# Patient Record
Sex: MALE | Race: WHITE | HISPANIC OR LATINO | Employment: OTHER | URBAN - METROPOLITAN AREA
[De-identification: names, ages, dates, MRNs, and addresses within clinical notes are randomized per-mention and may not be internally consistent; named-entity substitution may affect disease eponyms.]

---

## 2017-01-26 ENCOUNTER — ALLSCRIPTS OFFICE VISIT (OUTPATIENT)
Dept: OTHER | Facility: OTHER | Age: 62
End: 2017-01-26

## 2017-01-26 DIAGNOSIS — E78.5 HYPERLIPIDEMIA: ICD-10-CM

## 2017-01-26 DIAGNOSIS — R07.1 CHEST PAIN ON BREATHING: ICD-10-CM

## 2017-02-06 ENCOUNTER — GENERIC CONVERSION - ENCOUNTER (OUTPATIENT)
Dept: OTHER | Facility: OTHER | Age: 62
End: 2017-02-06

## 2017-02-27 ENCOUNTER — ALLSCRIPTS OFFICE VISIT (OUTPATIENT)
Dept: OTHER | Facility: OTHER | Age: 62
End: 2017-02-27

## 2017-03-27 ENCOUNTER — ALLSCRIPTS OFFICE VISIT (OUTPATIENT)
Dept: OTHER | Facility: OTHER | Age: 62
End: 2017-03-27

## 2017-04-17 LAB
BASOPHILS # BLD AUTO: 0 %
BASOPHILS # BLD AUTO: 0 X10E3/UL (ref 0–0.2)
DEPRECATED RDW RBC AUTO: 15 % (ref 12.3–15.4)
EOSINOPHIL # BLD AUTO: 0.2 X10E3/UL (ref 0–0.4)
EOSINOPHIL # BLD AUTO: 3 %
HCT VFR BLD AUTO: 39 % (ref 37.5–51)
HGB BLD-MCNC: 12.8 G/DL (ref 12.6–17.7)
IMM.GRANULOCYTES (CD4/8) (HISTORICAL): 0 %
IMM.GRANULOCYTES (CD4/8) (HISTORICAL): 0 X10E3/UL (ref 0–0.1)
LYMPHOCYTES # BLD AUTO: 2.6 X10E3/UL (ref 0.7–3.1)
LYMPHOCYTES # BLD AUTO: 43 %
MCH RBC QN AUTO: 27.5 PG (ref 26.6–33)
MCHC RBC AUTO-ENTMCNC: 32.8 G/DL (ref 31.5–35.7)
MCV RBC AUTO: 84 FL (ref 79–97)
MONOCYTES # BLD AUTO: 0.4 X10E3/UL (ref 0.1–0.9)
MONOCYTES (HISTORICAL): 6 %
NEUTROPHILS # BLD AUTO: 2.7 X10E3/UL (ref 1.4–7)
NEUTROPHILS # BLD AUTO: 48 %
PLATELET # BLD AUTO: 192 X10E3/UL (ref 150–379)
PROSTATE SPECIFIC ANTIGEN (HISTORICAL): 2.7 NG/ML (ref 0–4)
RBC (HISTORICAL): 4.66 X10E6/UL (ref 4.14–5.8)
WBC # BLD AUTO: 5.9 X10E3/UL (ref 3.4–10.8)

## 2017-04-18 LAB
A/G RATIO (HISTORICAL): 1.5 (ref 1.2–2.2)
ALBUMIN SERPL BCP-MCNC: 4.2 G/DL (ref 3.6–4.8)
ALP SERPL-CCNC: 74 IU/L (ref 39–117)
ALT SERPL W P-5'-P-CCNC: 33 IU/L (ref 0–44)
AST SERPL W P-5'-P-CCNC: 23 IU/L (ref 0–40)
BILIRUB SERPL-MCNC: 0.8 MG/DL (ref 0–1.2)
BUN SERPL-MCNC: 14 MG/DL (ref 8–27)
BUN/CREA RATIO (HISTORICAL): 12 (ref 10–24)
CALCIUM SERPL-MCNC: 8.6 MG/DL (ref 8.6–10.2)
CHLORIDE SERPL-SCNC: 105 MMOL/L (ref 96–106)
CHOLEST SERPL-MCNC: 208 MG/DL (ref 100–199)
CO2 SERPL-SCNC: 23 MMOL/L (ref 18–29)
CREAT SERPL-MCNC: 1.15 MG/DL (ref 0.76–1.27)
EGFR AFRICAN AMERICAN (HISTORICAL): 79 ML/MIN/1.73
EGFR-AMERICAN CALC (HISTORICAL): 68 ML/MIN/1.73
GLUCOSE SERPL-MCNC: 105 MG/DL (ref 65–99)
HDLC SERPL-MCNC: 44 MG/DL
LDLC SERPL CALC-MCNC: 114 MG/DL (ref 0–99)
POTASSIUM SERPL-SCNC: 4.1 MMOL/L (ref 3.5–5.2)
SODIUM SERPL-SCNC: 144 MMOL/L (ref 134–144)
TOT. GLOBULIN, SERUM (HISTORICAL): 2.8 G/DL (ref 1.5–4.5)
TOTAL PROTEIN (HISTORICAL): 7 G/DL (ref 6–8.5)
TRIGL SERPL-MCNC: 249 MG/DL (ref 0–149)

## 2017-04-19 ENCOUNTER — GENERIC CONVERSION - ENCOUNTER (OUTPATIENT)
Dept: OTHER | Facility: OTHER | Age: 62
End: 2017-04-19

## 2017-05-19 ENCOUNTER — GENERIC CONVERSION - ENCOUNTER (OUTPATIENT)
Dept: OTHER | Facility: OTHER | Age: 62
End: 2017-05-19

## 2017-06-26 ENCOUNTER — ALLSCRIPTS OFFICE VISIT (OUTPATIENT)
Dept: OTHER | Facility: OTHER | Age: 62
End: 2017-06-26

## 2017-07-21 LAB
CHOLEST SERPL-MCNC: 185 MG/DL (ref 100–199)
HBA1C MFR BLD HPLC: 5.6 % (ref 4.8–5.6)
HDLC SERPL-MCNC: 45 MG/DL
LDLC SERPL CALC-MCNC: 92 MG/DL (ref 0–99)
TRIGL SERPL-MCNC: 241 MG/DL (ref 0–149)

## 2017-07-26 ENCOUNTER — GENERIC CONVERSION - ENCOUNTER (OUTPATIENT)
Dept: OTHER | Facility: OTHER | Age: 62
End: 2017-07-26

## 2017-08-29 DIAGNOSIS — H92.03 OTALGIA OF BOTH EARS: ICD-10-CM

## 2017-09-05 ENCOUNTER — GENERIC CONVERSION - ENCOUNTER (OUTPATIENT)
Dept: OTHER | Facility: OTHER | Age: 62
End: 2017-09-05

## 2017-09-05 ENCOUNTER — ALLSCRIPTS OFFICE VISIT (OUTPATIENT)
Dept: OTHER | Facility: OTHER | Age: 62
End: 2017-09-05

## 2017-09-25 ENCOUNTER — GENERIC CONVERSION - ENCOUNTER (OUTPATIENT)
Dept: OTHER | Facility: OTHER | Age: 62
End: 2017-09-25

## 2017-10-09 ENCOUNTER — GENERIC CONVERSION - ENCOUNTER (OUTPATIENT)
Dept: OTHER | Facility: OTHER | Age: 62
End: 2017-10-09

## 2017-11-07 ENCOUNTER — GENERIC CONVERSION - ENCOUNTER (OUTPATIENT)
Dept: OTHER | Facility: OTHER | Age: 62
End: 2017-11-07

## 2018-01-08 ENCOUNTER — GENERIC CONVERSION - ENCOUNTER (OUTPATIENT)
Dept: OTHER | Facility: OTHER | Age: 63
End: 2018-01-08

## 2018-01-11 NOTE — MISCELLANEOUS
Provider Comments  Provider Comments:   L/M TO R/S      Signatures   Electronically signed by : GIANFRANCO Lam ; Sep 26 2017  7:23AM EST                       (Author)

## 2018-01-11 NOTE — MISCELLANEOUS
Message  I called patient left message re labs all good trig somewhat elevated will f/u      Signatures   Electronically signed by : GIANFRANCO Cervantes ; Jul 26 2017 12:55PM EST                       (Author)

## 2018-01-12 NOTE — MISCELLANEOUS
Message   Recorded as Task   Date: 05/15/2017 03:56 PM, Created By: Ophelia Inman   Task Name: Medical Record Request   Assigned To:  Garfield Cannon   Regarding Patient: Nettie Curtis, Status: Active   Comment:    Ophelia Inman - 15 May 2017 3:56 PM     TASK CREATED  PT WOULD LIKE A CALL ABOUT WHAT TO DO WITH CHOLESTROL RESULT   [address]  [phone]   I have left several messages as to chol results and instructions      Signatures   Electronically signed by : GIANFRANCO Cervantes ; May 19 2017 11:30AM EST                       (Author)

## 2018-01-13 VITALS
DIASTOLIC BLOOD PRESSURE: 90 MMHG | HEIGHT: 69 IN | HEART RATE: 66 BPM | OXYGEN SATURATION: 97 % | WEIGHT: 255.38 LBS | BODY MASS INDEX: 37.83 KG/M2 | RESPIRATION RATE: 18 BRPM | SYSTOLIC BLOOD PRESSURE: 130 MMHG

## 2018-01-13 NOTE — MISCELLANEOUS
Message  I called patient left message re labs chol elev I asked him to call to verify if he is still taking atorvastatin        Signatures   Electronically signed by : GIANFRANCO Wang ; Apr 19 2017 10:53AM EST                       (Author)

## 2018-01-14 VITALS
OXYGEN SATURATION: 97 % | HEIGHT: 69 IN | SYSTOLIC BLOOD PRESSURE: 140 MMHG | DIASTOLIC BLOOD PRESSURE: 76 MMHG | HEART RATE: 82 BPM | RESPIRATION RATE: 18 BRPM | BODY MASS INDEX: 36.96 KG/M2 | WEIGHT: 249.56 LBS

## 2018-01-14 VITALS
TEMPERATURE: 97.7 F | HEART RATE: 91 BPM | SYSTOLIC BLOOD PRESSURE: 126 MMHG | OXYGEN SATURATION: 96 % | HEIGHT: 69 IN | WEIGHT: 253.5 LBS | BODY MASS INDEX: 37.55 KG/M2 | DIASTOLIC BLOOD PRESSURE: 74 MMHG | RESPIRATION RATE: 18 BRPM

## 2018-01-15 VITALS
DIASTOLIC BLOOD PRESSURE: 80 MMHG | BODY MASS INDEX: 37.63 KG/M2 | WEIGHT: 254.06 LBS | SYSTOLIC BLOOD PRESSURE: 128 MMHG | HEART RATE: 77 BPM | HEIGHT: 69 IN | OXYGEN SATURATION: 95 % | RESPIRATION RATE: 18 BRPM | TEMPERATURE: 96.5 F

## 2018-01-16 NOTE — PROGRESS NOTES
Assessment    1  Encounter for preventive health examination (V70 0) (Z00 00)   2  H/O partial nephrectomy (V15 29) (Z90 5)    Plan   Health Maintenance    · (1) CBC/PLT/DIFF; Status:Active; Requested WCT:[id number];    · (1) COMPREHENSIVE METABOLIC PANEL; Status:Active; Requested RVR:[id number];    · (1) LIPID PANEL, FASTING; Status:Active; Requested for:25Jan2016;    · (1) PSA (SCREEN) (Dx V76 44 Screen for Prostate Cancer); Status:Active; Requested  EHY:[id number];     Urology Referral Other Physician Referral  Consult  Status: Hold For - Scheduling  Requested for: 08HII4027  Ordered; For: H/O partial nephrectomy;  Ordered By: Colletta Harpin  Performed:   Due: 12NGV1757     Discussion/Summary  Discussion Summary:   #HCM  -cbc,bmp,fasting lipid, PSA ordered  Chief Complaint  Chief Complaint Free Text Note Form: PT is here for 6 month FU for previous Dr Davonte Paulino  PT states every 6 month he needs blood work drawn for cholesterol and PSA  PT c/o RT side pain from previous surgery five years ago when he had 25% of his kidney removed  History of Present Illness  HPI: new patient for establishment of care  he takes lipitor 20mg daily, baby aspirin daily,   today, comlains of rear right back pain  he also doesn't know what respiratory problem he has, but was told he needed ventolin  uses it 4-5x a month  never hospitalized for difficulty breathing  he thinks it was copd  removed part of right kidney, 25%, 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  He would like a referral to see a urologist that he can follow up with now  He is employed,he works in [employer] maintenance  last colonoscopy done 3-4 years ago  pmh:high cholesterol  for more than 20 years  taking lipitor since then  Review of Systems  Complete-Male:   Constitutional: no fever and not feeling poorly  Eyes: no eyesight problems  ENT: no earache  Cardiovascular: no chest pain  Respiratory: no cough  Gastrointestinal: no abdominal pain and no nausea  Genitourinary: no dysuria, no urinary hesitancy, no incontinence and no nocturia  Musculoskeletal: no limb pain  Neurological: no numbness and no dizziness  Family History    1  No pertinent family history    2  No pertinent family history    Social History    · Non-smoker (V49 89) (Z78 9)    Current Meds   1  Lipitor 20 MG Oral Tablet; Therapy: ([place] [facility]) to Recorded   2  Ventolin AERS; Therapy: (Recorded:25Jan2016) to Recorded    Allergies    1  No Known Drug Allergies    Vitals  Vital Signs [Data Includes: Current Encounter]    Recorded: 64JGG5454 04:02PM   Temperature 97 F   Heart Rate 83   Respiration 18   Systolic 145   Diastolic 70   Height 5 ft 8 5 in   Weight 215 lb 4 oz   BMI Calculated 32 25   BSA Calculated 2 12   O2 Saturation 97     Physical Exam    Constitutional   General appearance: No acute distress, well appearing and well nourished  Ears, Nose, Mouth, and Throat   Otoscopic examination: Tympanic membrance translucent with normal light reflex  Canals patent without erythema  Oropharynx: Normal with no erythema, edema, exudate or lesions  Pulmonary   Auscultation of lungs: Clear to auscultation, equal breath sounds bilaterally, no wheezes, no rales, no rhonci  Cardiovascular   Auscultation of heart: Normal rate and rhythm, normal S1 and S2, without murmurs  Abdomen umbilical hernia 2cm diameter,reducible, non-tender,non-erythematous  Musculoskeletal   Gait and station: Normal     Digits and nails: Normal without clubbing or cyanosis  Attending Note  Attending Note: Attending Note: I discussed the case with the Resident and reviewed the Resident's note and I agree with the Resident management plan as it was presented to me  Signatures   Electronically signed by : GIANFRANCO Srivastava ; Feb 1 2016 12:09AM EST                       (Author)    Electronically signed by :  JUSTINA Ramirez ; Feb 1 2016  3:39PM EST (Author)

## 2018-01-22 VITALS
HEART RATE: 72 BPM | RESPIRATION RATE: 18 BRPM | WEIGHT: 258 LBS | OXYGEN SATURATION: 98 % | BODY MASS INDEX: 38.21 KG/M2 | HEIGHT: 69 IN | SYSTOLIC BLOOD PRESSURE: 144 MMHG | DIASTOLIC BLOOD PRESSURE: 84 MMHG

## 2018-01-22 VITALS
WEIGHT: 255 LBS | HEIGHT: 69 IN | SYSTOLIC BLOOD PRESSURE: 124 MMHG | BODY MASS INDEX: 37.77 KG/M2 | DIASTOLIC BLOOD PRESSURE: 82 MMHG

## 2018-01-24 VITALS
WEIGHT: 249 LBS | BODY MASS INDEX: 36.88 KG/M2 | DIASTOLIC BLOOD PRESSURE: 78 MMHG | OXYGEN SATURATION: 97 % | HEART RATE: 79 BPM | SYSTOLIC BLOOD PRESSURE: 116 MMHG | HEIGHT: 69 IN | RESPIRATION RATE: 18 BRPM

## 2018-02-13 ENCOUNTER — OFFICE VISIT (OUTPATIENT)
Dept: FAMILY MEDICINE CLINIC | Facility: CLINIC | Age: 63
End: 2018-02-13
Payer: COMMERCIAL

## 2018-02-13 VITALS
BODY MASS INDEX: 37.3 KG/M2 | RESPIRATION RATE: 16 BRPM | DIASTOLIC BLOOD PRESSURE: 82 MMHG | HEART RATE: 81 BPM | SYSTOLIC BLOOD PRESSURE: 122 MMHG | HEIGHT: 68 IN | WEIGHT: 246.1 LBS | OXYGEN SATURATION: 96 %

## 2018-02-13 DIAGNOSIS — R05.9 COUGH: Primary | ICD-10-CM

## 2018-02-13 PROCEDURE — 99213 OFFICE O/P EST LOW 20 MIN: CPT | Performed by: NURSE PRACTITIONER

## 2018-02-13 NOTE — PROGRESS NOTES
Assessment/Plan:         Diagnoses and all orders for this visit:    Cough        1  Use Mucinex for expectorant  2  Get plenty of rest  3  Drink plenty fluids will not feeling well  4  Follow up if condition changes or worsens    Subjective:      Patient ID: Maybelle Buerger is a 58 y o  male  51-year-old male presents with some chest congestion  Reports he had the flu last week but feels a little bit better  Denies fever  Took some over-the-counter medications for the flu  Helped  Reports he hears some phlegm in his chest         The following portions of the patient's history were reviewed and updated as appropriate: allergies and current medications  Review of Systems   Constitutional: Negative  HENT: Negative  Respiratory:        Chest congestion   Cardiovascular: Negative  Objective:    Vitals:    02/13/18 1327   BP: 122/82   Pulse: 81   Resp: 16   SpO2: 96%        Physical Exam   Constitutional: He appears well-developed and well-nourished  HENT:   Head: Normocephalic and atraumatic  Left Ear: External ear normal    Cardiovascular: Normal rate, regular rhythm and normal heart sounds      Pulmonary/Chest: Effort normal and breath sounds normal

## 2018-02-13 NOTE — LETTER
February 13, 2018     Patient: [place]   YOB: 1955   Date of Visit: 2/13/2018       To Whom it May Concern:    [place] is under my professional care  He was seen in my office on 2/13/2018  He may return to work on 2/13/2018  If you have any questions or concerns, please don't hesitate to call           Sincerely,          Yamile Otto NP        CC: No Recipients

## 2018-02-13 NOTE — PATIENT INSTRUCTIONS
Acute Cough   WHAT YOU NEED TO KNOW:   An acute cough can last up to 3 weeks  Common causes of an acute cough include a cold, allergies, or a lung infection  DISCHARGE INSTRUCTIONS:   Return to the emergency department if:   · You have trouble breathing or feel short of breath  · You cough up blood, or you see blood in your mucus  · You faint or feel weak or dizzy  · You have chest pain when you cough or take a deep breath  · You have new wheezing  Contact your healthcare provider if:   · You have a fever  · Your cough lasts longer than 4 weeks  · Your symptoms do not improve with treatment  · You have questions or concerns about your condition or care  Medicines:   · Medicines  may be needed to stop the cough, decrease swelling in your airways, or help open your airways  Medicine may also be given to help you cough up mucus  Ask your healthcare provider what over-the-counter medicines you can take  If you have an infection caused by bacteria, you may need antibiotics  · Take your medicine as directed  Contact your healthcare provider if you think your medicine is not helping or if you have side effects  Tell him or her if you are allergic to any medicine  Keep a list of the medicines, vitamins, and herbs you take  Include the amounts, and when and why you take them  Bring the list or the pill bottles to follow-up visits  Carry your medicine list with you in case of an emergency  Manage your symptoms:   · Do not smoke and stay away from others who smoke  Nicotine and other chemicals in cigarettes and cigars can cause lung damage and make your cough worse  Ask your healthcare provider for information if you currently smoke and need help to quit  E-cigarettes or smokeless tobacco still contain nicotine  Talk to your healthcare provider before you use these products  · Drink extra liquids as directed  Liquids will help thin and loosen mucus so you can cough it up   Liquids will also help prevent dehydration  Examples of good liquids to drink include water, fruit juice, and broth  Do not drink liquids that contain caffeine  Caffeine can increase your risk for dehydration  Ask your healthcare provider how much liquid to drink each day  · Rest as directed  Do not do activities that make your cough worse, such as exercise  · Use a humidifier or vaporizer  Use a cool mist humidifier or a vaporizer to increase air moisture in your home  This may make it easier for you to breathe and help decrease your cough  · Eat 2 to 5 mL of honey 2 times each day  Honey can help thin mucus and decrease your cough  · Use cough drops or lozenges  These can help decrease throat irritation and your cough  Follow up with your healthcare provider as directed:  Write down your questions so you remember to ask them during your visits  © 2017 [employer] Information is for End User's use only and may not be sold, redistributed or otherwise used for commercial purposes  All illustrations and images included in CareNotes® are the copyrighted property of A D A M , Inc  or William Fat[employer]  The above information is an  only  It is not intended as medical advice for individual conditions or treatments  Talk to your doctor, nurse or pharmacist before following any medical regimen to see if it is safe and effective for you

## 2018-03-07 NOTE — CONSULTS
Plan    1  Removal Impacted Cerumen Requiring Instrumentation one or both ears - POC;   Status:Complete;   Done: 22WJW2219 12:09PM    Assessment    1  Otalgia, bilateral (388 70) (H92 03)   2  TMJ (temporomandibular joint syndrome) (524 60) (M26 609)   3  Impacted cerumen of right ear (380 4) (H61 21)    Chief Complaint  Chief Complaint Free Text Note Form: Pt presents today with bilateral pain in ears  History of Present Illness  HPI: Mr Kamaljit Mackey presents today as a new patient consultation due to bilateral otalgia with right ear blocked  Bilateral ear pain has been occurring for years on and off and gradually worsening  Symptoms are not daily  Pain is more on the right versus the left and notices when he is sleeping on his right side  He is concerned about something in his right ear as he feels shifting inside the ear  No otorrhea although the right ear does itch at times  No current treatment for the concern  No changes in hearing  He also noted he bites on his right cheek inside at times  No current use of oral appliance  Review of Systems  Complete ENT ROS [facility]:   Eyes: No complaints of itching, excessive tearing or vision changes  Ears: No complaints of hearing loss, discharge, imbalance, recent ear infections, or tinnitus  Nose: obstruction  Mouth: No sores in mouth, no altered taste, no dental problems  Throat: No complaints of throat pain, no difficulty swallowing, no hoarseness  Neck: soreness  Genitourinary: frequent urination  Cardiovascular: No complaints of chest pain or palpitations  Respiratory: wheezing  Gastrointestinal: No complaints of heartburn, nausea/vomiting, or constipation  Constitutional: No fever, feels well, no tiredness  Psychiatric: No anxiety, no depression, no sleep disturbances  Musculoskeletal: No complaints of arthralgias, myalgias or limb pain  Integumentary: No complaints of skin rash, itching or skin lesions     Endocrine: No complaints of proptosis, muscle weakness or feelings of weakness  Hematologic/Lymphatic: No complaints of swollen glands in the neck, does not bleed easily, does not bruise easily  ROS Reviewed:   ROS reviewed  Active Problems    1  BMI 37 0-37 9, adult (V85 37) (Z68 37)   2  BMI 38 0-38 9,adult (V85 38) (Z68 38)   3  Chest pain on respiration (786 52) (R07 1)   4  COPD (chronic obstructive pulmonary disease) (496) (J44 9)   5  Dyslipidemia (272 4) (E78 5)   6  Dyspnea on exertion (786 09) (R06 09)   7  Encounter for screening for cardiovascular disorders (V81 2) (Z13 6)   8  Flu vaccine need (V04 81) (Z23)   9  H/O partial nephrectomy (V15 29) (Z90 5)   10  Hyperglycemia (790 29) (R73 9)   11  Need for pneumococcal vaccination (V03 82) (Z23)   12  Otalgia, bilateral (388 70) (H92 03)   13  Pain of left scapula (733 90) (M89 8X1)   14  Prostate cancer screening (V76 44) (Z12 5)   15  Screening for colon cancer (V76 51) (Z12 11)   16  Screening for depression (V79 0) (Z13 89)   17  Seborrheic keratoses (702 19) (L82 1)   18  Thyroid disorder screening (V77 0) (Z13 29)    Past Medical History    1  History of lung disease (V12 60) (Z87 09)   2  History of malignant neoplasm (V10 90) (Z85 9)  Past Medical History Reviewed: The past medical history was reviewed and updated today  Surgical History    1  History of Kidney Surgery  Surgical History Reviewed: The surgical history was reviewed and updated today  Family History  Mother    1  No pertinent family history  Family History    2  No pertinent family history  Family History Reviewed: The family history was reviewed and updated today  Social History    · Employed   · Former smoker (F68 61) (P18 941)   ·    · Non-smoker (V49 89) (Z78 9)   · Occasional alcohol use  Social History Reviewed: The social history was reviewed and updated today  Current Meds   1   Atorvastatin Calcium 20 MG Oral Tablet; TAKE 1 TABLET AT BEDTIME  Requested for: 95SLK7289; Last YM:21EPM8437; Status: [address] - Awaiting   Verification Ordered   2  Ventolin  (90 Base) MCG/ACT Inhalation Aerosol Solution; INHALE 1-2 PUFFS   EVERY 4-6 HOURS AS NEEDED AND AS DIRECTED; Therapy: 17MED5681 to (Last Rx:24Oct2016)  Requested for: 24Oct2016 Ordered    Allergies    1  No Known Drug Allergies    Vitals  Signs   Recorded: 85TPF8250 25:84LN   Systolic: 837, LUE, Sitting  Diastolic: 82, LUE, Sitting  Height: 5 ft 9 in  Weight: 255 lb   BMI Calculated: 37 66  BSA Calculated: 2 29    Physical Exam    Constitutional:   General appearance: Well developed, well nourished  Ability to communicate: Voice normal  Speech normal    Head and Face:   Head and face: Head normocephalic, atraumatic with no lesions or palpable masses  Submandibular glands and parotid glands: non tender, no masses  Eyes:   Test of Ocular Motility: Gaze normal  No nystagmus  Ears:   Otoscopic examination: Abnormal  The right external canal had a cerumen impaction  Hearing: Normal    Nose:   External auditory canals: No cerumen impaction noted, no drainage observed, no edema noted in EAC, no exostoses present, no osteoma present, no tenderness noted  External Inspection of Nose: No deformities observed, no deviation of bone structure, no skin lesion present, no swelling present  Nares are symmetric, no deviation of caudal portion of septum  Nasal Mucosa: No congestion observed, no mucosal lesion or masses present, no ulcerations observed  Cartilaginous Septum: midline, no bleeding noted, no crusting present, no perforation noted  Turbinates: No hypertrophy or inflammation noted  unable to visualize due to gag reflex  Mouth: Inspection of Lips, Teeth, Gums: Lips normal in color, moist, no cracks or lesions  No loose teeth, no missing teeth  Gingiva: no bleeding observed, no inflammation present  Hard Palate: no asymmetry observed, no torus present   Soft palate normal with no ulcers noted  buccal scarring and dental wear  Throat:   Examination of Oropharynx: Oral Mucosa: no masses, lesions, leukoplakia, or scarring  Normal Landen's ducts, pink and moist, no discoloration noted  Floor of mouth: normal Warthin's ducts, no lesions, ulcerations, leukoplakia or torus mandibularis  Tonsils: no hypertrophy or ulcerations noted  Tongue: normal mobility, surfaces without fissures, leukoplakia, ulceration or masses, not enlarged, no pallor noted, no white patches present  unable to visualize due to gag reflex  Neck:   Examination of Neck: No decreased range of motion, trachea midline  fullness of TMJ  Examination of Thyroid: Normal size, non-tender, no palpable masses  Pulmonary:   Respiratory effort: Normal respiratory rate and rhythm, no increased work of breathing  Cardiovascular:   Inspection of Peripheral vascular system by observation: Normal    Lymphatic:   Palpation of Lymph Nodes: Neck: No generalized lymphadenopathy  Neurological/Psychiatric:   Cranial nerves II-VII grossly intact  Oriented to person, place, and time  Cooperative, in no acute distress  Discussion/Summary  Discussion Summary:   Mr Walter Killian presents today as a new patient consultation per Dr Jeremy Chen due to right cerumen impaction, TMJ syndrome, and bilateral otalgia  On exam noted fullness bilateral transmandibular joint, buccal scarring bilaterally (worse right vs left) and right cerumen impaction  Cerumen and loose hair removed right eac with irrigation and suction  Pt tolerated well  Discussed TMJ syndrome in detail as it is most likely cause of his bilateral otalgia  Reviewed options for treatment including soft diet, jaw rest, NSAIDs, warm compresses, massage, oral appliance, or consultation with oral surgeon  Pt agreed with recommendations and will follow up as needed  Goals and Barriers: The patient has the current Goals: Monitor symptoms     Patient's Capacity to Self-Care: Patient is able to Self-Care  Medication SE Review and Pt Understands Tx: The treatment plan was reviewed with the patient/guardian   The patient/guardian understands and agrees with the treatment plan      Signatures   Electronically signed by : Tamera Barros NP; Sep  5 2017 12:15PM EST                       (Author)

## 2018-03-12 ENCOUNTER — OFFICE VISIT (OUTPATIENT)
Dept: FAMILY MEDICINE CLINIC | Facility: CLINIC | Age: 63
End: 2018-03-12
Payer: COMMERCIAL

## 2018-03-12 VITALS
RESPIRATION RATE: 16 BRPM | OXYGEN SATURATION: 97 % | DIASTOLIC BLOOD PRESSURE: 72 MMHG | SYSTOLIC BLOOD PRESSURE: 138 MMHG | BODY MASS INDEX: 38.04 KG/M2 | HEIGHT: 68 IN | HEART RATE: 92 BPM | WEIGHT: 251 LBS

## 2018-03-12 DIAGNOSIS — J43.9 PULMONARY EMPHYSEMA, UNSPECIFIED EMPHYSEMA TYPE (HCC): Primary | ICD-10-CM

## 2018-03-12 DIAGNOSIS — L82.1 SEBORRHEIC KERATOSES: ICD-10-CM

## 2018-03-12 PROBLEM — R73.9 HYPERGLYCEMIA: Status: ACTIVE | Noted: 2017-06-26

## 2018-03-12 PROBLEM — M26.609 TMJ (TEMPOROMANDIBULAR JOINT SYNDROME): Status: ACTIVE | Noted: 2017-09-05

## 2018-03-12 PROCEDURE — 99213 OFFICE O/P EST LOW 20 MIN: CPT | Performed by: FAMILY MEDICINE

## 2018-03-12 RX ORDER — ATORVASTATIN CALCIUM 20 MG/1
20 TABLET, FILM COATED ORAL
COMMUNITY
Start: 2015-09-30 | End: 2019-02-19 | Stop reason: SDUPTHER

## 2018-03-12 RX ORDER — FLUTICASONE PROPIONATE 50 MCG
SPRAY, SUSPENSION (ML) NASAL
COMMUNITY
Start: 2014-09-26 | End: 2018-05-11 | Stop reason: SDUPTHER

## 2018-03-12 RX ORDER — ALBUTEROL SULFATE 90 UG/1
2 AEROSOL, METERED RESPIRATORY (INHALATION) EVERY 6 HOURS PRN
Qty: 1 INHALER | Refills: 5 | Status: SHIPPED | OUTPATIENT
Start: 2018-03-12 | End: 2018-05-08 | Stop reason: SDUPTHER

## 2018-03-12 RX ORDER — ALBUTEROL SULFATE 90 UG/1
AEROSOL, METERED RESPIRATORY (INHALATION)
COMMUNITY
Start: 2013-04-05 | End: 2018-05-08 | Stop reason: SDUPTHER

## 2018-03-12 NOTE — PROGRESS NOTES
Assessment/Plan:    Will cont pres  meds  For copd Due for labs in July will f/u with derm      Diagnoses and all orders for this visit:    Pulmonary emphysema, unspecified emphysema type (HCC)  -     albuterol (VENTOLIN HFA) 90 mcg/act inhaler; Inhale 2 puffs every 6 (six) hours as needed for wheezing    Other orders  -     albuterol (PROVENTIL HFA,VENTOLIN HFA) 90 mcg/act inhaler; Inhale  -     aspirin 81 MG tablet; Take 81 mg by mouth  -     atorvastatin (LIPITOR) 20 mg tablet; Take 20 mg by mouth  -     fluticasone (FLONASE) 50 mcg/act nasal spray; into each nostril          Subjective:      Patient ID: Maybelle Buerger is a 58 y o  male  Patient seen in f/u  Has noted some sob after climbing stairs  Uses inhaler occ needs refill otherwise okay  Has mult skin lesions needs to f/u with derm        The following portions of the patient's history were reviewed and updated as appropriate: past family history, past medical history, past social history and past surgical history      Review of Systems      Objective:      /72 (BP Location: Left arm, Patient Position: Sitting)   Pulse 92   Resp 16   Ht 5' 8" (1 727 m)   Wt 114 kg (251 lb)   SpO2 97%   BMI 38 16 kg/m²          Physical Exam

## 2018-03-28 ENCOUNTER — OFFICE VISIT (OUTPATIENT)
Dept: FAMILY MEDICINE CLINIC | Facility: CLINIC | Age: 63
End: 2018-03-28
Payer: COMMERCIAL

## 2018-03-28 VITALS
SYSTOLIC BLOOD PRESSURE: 124 MMHG | OXYGEN SATURATION: 96 % | HEIGHT: 68 IN | DIASTOLIC BLOOD PRESSURE: 72 MMHG | BODY MASS INDEX: 38.34 KG/M2 | HEART RATE: 76 BPM | RESPIRATION RATE: 16 BRPM | TEMPERATURE: 97.8 F | WEIGHT: 253 LBS

## 2018-03-28 DIAGNOSIS — R05.9 COUGH IN ADULT: ICD-10-CM

## 2018-03-28 DIAGNOSIS — Z12.11 SCREEN FOR COLON CANCER: Primary | ICD-10-CM

## 2018-03-28 DIAGNOSIS — H65.92 FLUID LEVEL BEHIND TYMPANIC MEMBRANE OF LEFT EAR: ICD-10-CM

## 2018-03-28 PROCEDURE — 99213 OFFICE O/P EST LOW 20 MIN: CPT | Performed by: FAMILY MEDICINE

## 2018-03-28 PROCEDURE — 3725F SCREEN DEPRESSION PERFORMED: CPT | Performed by: FAMILY MEDICINE

## 2018-03-28 RX ORDER — BENZONATATE 100 MG/1
100 CAPSULE ORAL 3 TIMES DAILY PRN
Qty: 20 CAPSULE | Refills: 0 | Status: SHIPPED | OUTPATIENT
Start: 2018-03-28 | End: 2018-05-08 | Stop reason: ALTCHOICE

## 2018-03-28 RX ORDER — FLUTICASONE PROPIONATE 50 MCG
1 SPRAY, SUSPENSION (ML) NASAL DAILY
Qty: 16 G | Refills: 0 | Status: SHIPPED | OUTPATIENT
Start: 2018-03-28 | End: 2018-07-31 | Stop reason: ALTCHOICE

## 2018-03-28 RX ORDER — ALBUTEROL SULFATE 90 UG/1
2 AEROSOL, METERED RESPIRATORY (INHALATION) EVERY 6 HOURS PRN
Qty: 1 INHALER | Refills: 0 | Status: SHIPPED | OUTPATIENT
Start: 2018-03-28 | End: 2018-09-21 | Stop reason: SDUPTHER

## 2018-03-28 NOTE — PROGRESS NOTES
Assessment/Plan:     Diagnoses and all orders for this visit:    Screen for colon cancer    Cough in adult  -     benzonatate (TESSALON PERLES) 100 mg capsule; Take 1 capsule (100 mg total) by mouth 3 (three) times a day as needed for cough  -     albuterol (VENTOLIN HFA) 90 mcg/act inhaler; Inhale 2 puffs every 6 (six) hours as needed for wheezing    Fluid level behind tympanic membrane of left ear  -     fluticasone (FLONASE) 50 mcg/act nasal spray; 1 spray into each nostril daily          Subjective:      Patient ID: Evy Dejesus is a 58 y o  male  Patient is a 57 yo male presents with 5 day history of sneezing, productive cough with thick yellow-green sputum, chest pain, and wheezing  Patient  explains symptoms began 3/23/18 as sneezing and rhinorrhea, that progressed to productive cough and wheezing  Patient has tried OTC "care-one" with minimal relief and came to his PCP under the recommendation of his daughter  Patient also reports felling more tired than usual  He also c/o chest pain and flank pain associated with his aggressive coughing  Patient quitted smoking 20 years ago  Cough   This is a new problem  The current episode started in the past 7 days  The problem has been gradually worsening  The cough is productive of sputum  Pertinent negatives include no chills, ear pain, fever, headaches, rhinorrhea or sore throat  Risk factors: which he has quit over 20 years ago  He has tried OTC cough suppressant for the symptoms  The treatment provided mild relief  The following portions of the patient's history were reviewed and updated as appropriate: allergies, current medications, past family history, past medical history, past social history, past surgical history and problem list     Review of Systems   Constitutional: Negative for chills and fever  HENT: Positive for sneezing  Negative for ear discharge, ear pain, rhinorrhea and sore throat      Eyes: Negative for photophobia and visual disturbance  Respiratory: Positive for cough  Gastrointestinal: Negative for abdominal distention and abdominal pain  Neurological: Negative  Negative for dizziness and headaches  Objective:      /72 (BP Location: Left arm, Patient Position: Sitting)   Pulse 76   Temp 97 8 °F (36 6 °C)   Resp 16   Ht 5' 8" (1 727 m)   Wt 115 kg (253 lb)   SpO2 96%   BMI 38 47 kg/m²          Physical Exam   Constitutional: He appears well-developed and well-nourished  HENT:   Head: Normocephalic and atraumatic  Right Ear: External ear normal    Left middle ear effusion   Cardiovascular: Normal rate, regular rhythm and normal heart sounds  Pulmonary/Chest: Effort normal and breath sounds normal  No respiratory distress  He has no wheezes

## 2018-05-08 ENCOUNTER — OFFICE VISIT (OUTPATIENT)
Dept: FAMILY MEDICINE CLINIC | Facility: CLINIC | Age: 63
End: 2018-05-08
Payer: COMMERCIAL

## 2018-05-08 VITALS
DIASTOLIC BLOOD PRESSURE: 78 MMHG | HEIGHT: 68 IN | TEMPERATURE: 98.2 F | OXYGEN SATURATION: 98 % | SYSTOLIC BLOOD PRESSURE: 122 MMHG | HEART RATE: 80 BPM | RESPIRATION RATE: 18 BRPM | WEIGHT: 252.31 LBS | BODY MASS INDEX: 38.24 KG/M2

## 2018-05-08 DIAGNOSIS — M70.21 OLECRANON BURSITIS OF RIGHT ELBOW: Primary | ICD-10-CM

## 2018-05-08 PROBLEM — R06.09 DYSPNEA ON EXERTION: Status: ACTIVE | Noted: 2017-01-26

## 2018-05-08 PROBLEM — R06.00 DYSPNEA ON EXERTION: Status: ACTIVE | Noted: 2017-01-26

## 2018-05-08 PROCEDURE — 99213 OFFICE O/P EST LOW 20 MIN: CPT | Performed by: FAMILY MEDICINE

## 2018-05-08 RX ORDER — MELOXICAM 7.5 MG/1
7.5 TABLET ORAL DAILY
Qty: 14 TABLET | Refills: 0 | Status: SHIPPED | OUTPATIENT
Start: 2018-05-08 | End: 2018-07-31 | Stop reason: ALTCHOICE

## 2018-05-08 NOTE — PATIENT INSTRUCTIONS
Elbow Bursitis Exercises   AMBULATORY CARE:   Elbow bursitis exercises  help decrease pain and swelling  They also help increase the movement and strength of your elbow  You will need to start with range-of-motion exercises  When you can do these exercises without pain, you will move to strength exercises  Your healthcare provider will show you how to do movement and strength exercises  The provider will tell you how often to do the exercises  Contact your healthcare provider if:   · You have a fever or chills  · The skin around your elbow is red or warm  · Your pain and swelling increase  · Your symptoms do not improve after 10 days of treatment  · You have questions or concerns about elbow bursitis exercises  Exercises to increase range of motion:   · Finger extensions:  Hold the fingertips of your injured arm close together with your fingers and thumb straight  Put a rubber band around the outside of your fingertips and thumb  Spread your fingers apart and then slowly bring them together without letting the rubber band fall off  Repeat 40 times  · :  Hold a soft rubber ball or tennis ball in your hand  Squeeze the ball as hard as you can and hold this position  Ask your healthcare provider how long to hold this position  Repeat this exercise as directed by your healthcare provider  · Wrist flexor stretch:  Hold your arm straight out in front of you with your palm facing down  Use your other hand to grasp your fingers  Keep your elbow straight and slowly bend your hand back  Your fingertips should point up and your palm should face away from you  Do this until you feel a stretch in the top of your wrist  Hold for 10 seconds  Repeat 5 times  · Wrist extensor stretch: This stretch is the opposite of the wrist flexor stretch  Hold your arm straight out in front of you with your palm facing down  Use your other hand to grasp your fingers   Keep your elbow straight and slowly bend your hand down  Your fingertips should point down and your palm should face you  Do this until you feel a stretch in your wrist  Hold for 10 seconds  Repeat 5 times  · Elbow flexor stretch:  Bend your elbow, and keep your palm facing toward you  Use your other hand to press gently on the back of your forearm so your arm moves toward you  Do this until you feel a stretch in the back of your upper arm  Hold for 15 to 30 seconds  Repeat 5 times  · Elbow extension stretch: This exercise is the opposite of the elbow flexor stretch  Sit in a chair with your arm resting on your thigh  Hold your wrist with the other hand  Slowly straighten your arm so it is extended as far as possible  Keep holding your wrist as you move your arm slowly back to the starting position  Repeat 5 times  Exercises to increase strength:   · Wrist curls:  Sit in a chair with your forearm resting on your thigh or on a table  Hold a 3 pound dumbbell with your palm facing up  Bend your wrist up and then slowly lower it down  Repeat 20 times  · Forearm rotation:  Sit in a chair with your forearm resting on your thigh or on a table  Hold a 2 pound dumbbell with your palm facing up  Slowly turn your forearm until your palm faces down  Then slowly return to the starting position  Repeat 20 times  · Bicep curls:  Place your hand under your injured elbow for support  Turn your palm so that it faces up and hold a weight in your hand  Ask your healthcare provider how much weight you should use  Slowly bend and straighten your elbow  Repeat 30 times  © 2017 [employer] [employer] Information is for End User's use only and may not be sold, redistributed or otherwise used for commercial purposes  All illustrations and images included in CareNotes® are the copyrighted property of [email or web address]  or [employer]  The above information is an  only   It is not intended as medical advice for individual conditions or treatments  Talk to your doctor, nurse or pharmacist before following any medical regimen to see if it is safe and effective for you  Elbow Bursitis   WHAT YOU NEED TO KNOW:   Elbow bursitis is inflammation of the bursa in your elbow  The bursa is a fluid-filled sac that acts as a cushion between a bone and a tendon  A tendon is a cord of strong tissue that connects muscles to bones  The bursa is located right under the point of your elbow  DISCHARGE INSTRUCTIONS:   Medicines:   · NSAIDs:  These medicines decrease swelling, pain, and fever  NSAIDs are available without a doctor's order  Ask your healthcare provider which medicine is right for you  Ask how much to take and when to take it  Take as directed  NSAIDs can cause stomach bleeding and kidney problems if not taken correctly  · Antibiotics: These help fight an infection caused by bacteria  You may need antibiotics if your bursitis is caused by infection  · Take your medicine as directed  Contact your healthcare provider if you think your medicine is not helping or if you have side effects  Tell him of her if you are allergic to any medicine  Keep a list of the medicines, vitamins, and herbs you take  Include the amounts, and when and why you take them  Bring the list or the pill bottles to follow-up visits  Carry your medicine list with you in case of an emergency  Manage your symptoms:   · Rest:  Rest your elbow as much as possible to decrease pain and swelling  Slowly start to do more each day  Return to your daily activities as directed  · Ice:  Ice helps decrease swelling and pain  Ice may also help prevent tissue damage  Use an ice pack, or put crushed ice in a plastic bag  Cover it with a towel and place it on your elbow for 15 to 20 minutes, 3 to 4 times each day, as directed  · Compress:  Healthcare providers may wrap your arm with tape or an elastic bandage to decrease swelling   Loosen the elastic bandage if you start to lose feeling in your fingers  · Elevate:  Raise your elbow above the level of your heart as often as you can  This will help decrease swelling and pain  Prop your elbow on pillows or blankets to keep it elevated comfortably  Physical therapy:  A physical therapist teaches you exercises to help improve movement and strength, and to decrease pain  Prevent another elbow injury:   · Avoid injury and pressure to your elbows: Wear elbow pads or protectors when you play sports  Do not lean on your elbows or clench your fists  Do not tightly  small items, such as tools or pens  · Stretch, warm up, and cool down:  Always stretch and do warmup and cool-down exercises before and after you exercise  This will help loosen your muscles and decrease stress on your elbow  Rest between workouts  Follow up with your healthcare provider as directed:  Write down your questions so you remember to ask them during your visits  Contact your healthcare provider if:   · Your pain and swelling increase  · Your symptoms do not improve after 10 days of treatment  · You have a fever  · You have questions or concerns about your condition or care  © 2017 [address] [facility] Information is for End User's use only and may not be sold, redistributed or otherwise used for commercial purposes  All illustrations and images included in CareNotes® are the copyrighted property of [employer]  or William Fatima  The above information is an  only  It is not intended as medical advice for individual conditions or treatments  Talk to your doctor, nurse or pharmacist before following any medical regimen to see if it is safe and effective for you

## 2018-05-08 NOTE — PROGRESS NOTES
Assessment/Plan:       Diagnoses and all orders for this visit:    Olecranon bursitis of right elbow  -     meloxicam (MOBIC) 7 5 mg tablet; Take 1 tablet (7 5 mg total) by mouth daily for 14 days    BMI 38 0-38 9,adult        [place] appears to be experiencing right olecranon bursitis  Recommended conservative therapy with an elbow sleeve, NSAIDs for pain and ice to affected area  Work note for light duty was also provided  If symptoms do not improve and/or worsen, he was advised to come back for re-evaluation  I discussed how we may have to aspirate the bursa, although that is not preferred unless necessary  He acknowledged understanding and agreement with the plan  Subjective:      Patient ID: Mathieu Chakraborty is a 58 y o  male  [facility] is a 58year old male that comes to the office due to concerns of right elbow swelling  He states that he experienced an injury 7 days ago when he fell off a chair and landed on his right elbow  He didn't have any symptoms until last night when he experienced severe right elbow pain, sharp in quality, non-radiating  He tried Aleve for the pain, which helped  Denies weakness, numbness or tingling  The right elbow is associated with some swelling  The following portions of the patient's history were reviewed and updated as appropriate: allergies, current medications, past family history, past medical history, past social history, past surgical history and problem list     Review of Systems   Constitutional: Negative for chills and fever  HENT: Negative for congestion, ear pain, rhinorrhea and sore throat  Eyes: Negative for visual disturbance  Respiratory: Negative for cough and shortness of breath  Cardiovascular: Negative for chest pain and palpitations  Gastrointestinal: Negative for abdominal pain, constipation, diarrhea, nausea and vomiting  Genitourinary: Negative for dysuria  Skin: Negative for rash     Neurological: Negative for headaches  Objective:      /78 (BP Location: Left arm, Patient Position: Sitting)   Pulse 80   Temp 98 2 °F (36 8 °C) (Tympanic)   Resp 18   Ht 5' 8" (1 727 m)   Wt 114 kg (252 lb 5 oz)   SpO2 98%   BMI 38 36 kg/m²          Physical Exam   Constitutional: He is oriented to person, place, and time  He appears well-developed and well-nourished  No distress  HENT:   Head: Normocephalic and atraumatic  Right Ear: External ear normal    Left Ear: External ear normal    Eyes: Conjunctivae and EOM are normal  Pupils are equal, round, and reactive to light  Right eye exhibits no discharge  Left eye exhibits no discharge  Neck: Neck supple  Cardiovascular: Normal rate, regular rhythm and normal heart sounds  No murmur heard  Pulmonary/Chest: Effort normal and breath sounds normal  No respiratory distress  He has no wheezes  Abdominal: Soft  Bowel sounds are normal  There is no tenderness  Musculoskeletal: Normal range of motion  He exhibits no edema  Right elbow: Olecranon process tenderness noted  Neurological: He is alert and oriented to person, place, and time  Skin: Skin is warm and dry  He is not diaphoretic  Psychiatric: He has a normal mood and affect  Right Elbow Exam     Tenderness   The patient is experiencing tenderness in the olecranon process      Range of Motion   Extension:  -10  Flexion:      120  Pronation:   Normal  Supination: Normal    Muscle Strength   Wrist Extension: 5/5  Wrist Flexion:   5/5  :                   5/5  Pronation:            Supination:            Comments:  Swelling over right olecranon associated with tenderness to palpation  Pain with resisted extension  Sensation intact and equal bilaterally to light touch

## 2018-05-08 NOTE — LETTER
May 8, 2018     Patient: [place]   YOB: 1955   Date of Visit: 5/8/2018       To Whom it May Concern:    [place] is under my professional care  He was seen in my office on 5/8/2018  He may return to work with limitations light duty for one week  If you have any questions or concerns, please don't hesitate to call           Sincerely,          Allison Caldera MD

## 2018-05-11 ENCOUNTER — OFFICE VISIT (OUTPATIENT)
Dept: FAMILY MEDICINE CLINIC | Facility: CLINIC | Age: 63
End: 2018-05-11
Payer: COMMERCIAL

## 2018-05-11 ENCOUNTER — TELEPHONE (OUTPATIENT)
Dept: FAMILY MEDICINE CLINIC | Facility: CLINIC | Age: 63
End: 2018-05-11

## 2018-05-11 VITALS
HEART RATE: 78 BPM | SYSTOLIC BLOOD PRESSURE: 120 MMHG | HEIGHT: 68 IN | BODY MASS INDEX: 38.19 KG/M2 | OXYGEN SATURATION: 96 % | WEIGHT: 252 LBS | RESPIRATION RATE: 16 BRPM | DIASTOLIC BLOOD PRESSURE: 78 MMHG

## 2018-05-11 DIAGNOSIS — M71.121 SEPTIC BURSITIS OF ELBOW, RIGHT: Primary | ICD-10-CM

## 2018-05-11 LAB
BASOPHILS # BLD AUTO: 0 X10E3/UL (ref 0–0.2)
BASOPHILS NFR BLD AUTO: 0 %
EOSINOPHIL # BLD AUTO: 0.2 X10E3/UL (ref 0–0.4)
EOSINOPHIL NFR BLD AUTO: 2 %
ERYTHROCYTE [DISTWIDTH] IN BLOOD BY AUTOMATED COUNT: 15.9 % (ref 12.3–15.4)
ERYTHROCYTE [SEDIMENTATION RATE] IN BLOOD BY WESTERGREN METHOD: 54 MM/HR (ref 0–30)
HCT VFR BLD AUTO: 37.1 % (ref 37.5–51)
HGB BLD-MCNC: 12.6 G/DL (ref 13–17.7)
IMM GRANULOCYTES # BLD: 0 X10E3/UL (ref 0–0.1)
IMM GRANULOCYTES NFR BLD: 0 %
LYMPHOCYTES # BLD AUTO: 2.3 X10E3/UL (ref 0.7–3.1)
LYMPHOCYTES NFR BLD AUTO: 26 %
MCH RBC QN AUTO: 28.5 PG (ref 26.6–33)
MCHC RBC AUTO-ENTMCNC: 34 G/DL (ref 31.5–35.7)
MCV RBC AUTO: 84 FL (ref 79–97)
MONOCYTES # BLD AUTO: 0.6 X10E3/UL (ref 0.1–0.9)
MONOCYTES NFR BLD AUTO: 6 %
NEUTROPHILS # BLD AUTO: 6 X10E3/UL (ref 1.4–7)
NEUTROPHILS NFR BLD AUTO: 66 %
PLATELET # BLD AUTO: 202 X10E3/UL (ref 150–379)
RBC # BLD AUTO: 4.42 X10E6/UL (ref 4.14–5.8)
WBC # BLD AUTO: 9.1 X10E3/UL (ref 3.4–10.8)

## 2018-05-11 PROCEDURE — 99213 OFFICE O/P EST LOW 20 MIN: CPT | Performed by: FAMILY MEDICINE

## 2018-05-11 RX ORDER — CEPHALEXIN 500 MG/1
500 CAPSULE ORAL EVERY 8 HOURS SCHEDULED
Qty: 21 CAPSULE | Refills: 0 | Status: SHIPPED | OUTPATIENT
Start: 2018-05-11 | End: 2018-05-18

## 2018-05-11 NOTE — PATIENT INSTRUCTIONS
This 41-year-old male has worsening right elbow pain  The elbow is red and swollen and painful with movement  The patient cannot extend his right elbow  completely  The right elbow it is also warm to touch  I am concerned that the patient might have a septic bursitis of the right elbow  The patient will be sent for an x-ray of the right elbow today  According to the patient he must go to [facility] for his x-rays  The patient will also be given a stat order for CBC and diff and sed rate to be done at [facility]  according to his insurance  The patient will be started on cephalexin 500 mg t i d  for 1 week  The patient will need to follow up at least in 1 week for the bursitis  If the patient's blood work reveals an elevated white blood cell count there is a potentially could be admitted for IV antibiotics    Depending on the x-ray and lab results he may also require an orthopedic referral

## 2018-05-11 NOTE — PROGRESS NOTES
Assessment/Plan:    No problem-specific Assessment & Plan notes found for this encounter  Diagnoses and all orders for this visit:    Septic bursitis of elbow, right  -     cephalexin (KEFLEX) 500 mg capsule; Take 1 capsule (500 mg total) by mouth every 8 (eight) hours for 7 days  -     CBC and differential; Future  -     Sedimentation rate, automated; Future  -     CBC and differential  -     Sedimentation rate, automated  -     XR elbow 3+ vw right; Future        Subjective:      Patient ID: Echo De La Fuente is a 58 y o  male  This is a 40-year-old male who was seen on Monday for complaints of right elbow pain  He was diagnosed with right elbow bursitis  He was started on anti-inflammatories for the bursitis  Patient states since that times pain is increased and the elbow has become swollen hot and red  Patient denies any fevers  Patient states that he fell onto the right elbow approximately 8 days ago  The following portions of the patient's history were reviewed and updated as appropriate: allergies, current medications, past family history, past medical history, past social history, past surgical history and problem list     Review of Systems   Constitutional: Negative for fever  Respiratory: Negative  Cardiovascular: Negative  Musculoskeletal: Positive for joint swelling  The patient complains of right elbow warmth, swelling, and redness  He also has decreased range of motion of the right elbow in cannot extend it completely  Skin:        There is erythema involving the right elbow joint  Psychiatric/Behavioral: Negative  Objective:      /78 (BP Location: Left arm, Patient Position: Sitting)   Pulse 78   Resp 16   Ht 5' 8" (1 727 m)   Wt 114 kg (252 lb)   SpO2 96%   BMI 38 32 kg/m²          Physical Exam   Constitutional: He is oriented to person, place, and time  He appears well-developed and well-nourished     Patient is obese with a BMI of Musculoskeletal: He exhibits edema and tenderness  Patient has redness and warmth to the Olecranon area of the right elbow  There is pain on palpation to the same area  Neurological: He is alert and oriented to person, place, and time  Psychiatric: He has a normal mood and affect  His behavior is normal  Judgment and thought content normal    Vitals reviewed

## 2018-06-19 ENCOUNTER — OFFICE VISIT (OUTPATIENT)
Dept: FAMILY MEDICINE CLINIC | Facility: CLINIC | Age: 63
End: 2018-06-19
Payer: COMMERCIAL

## 2018-06-19 VITALS
RESPIRATION RATE: 20 BRPM | HEIGHT: 68 IN | HEART RATE: 91 BPM | WEIGHT: 250 LBS | BODY MASS INDEX: 37.89 KG/M2 | DIASTOLIC BLOOD PRESSURE: 80 MMHG | OXYGEN SATURATION: 96 % | TEMPERATURE: 97.9 F | SYSTOLIC BLOOD PRESSURE: 122 MMHG

## 2018-06-19 DIAGNOSIS — M10.9 GOUTY ARTHRITIS OF RIGHT GREAT TOE: Primary | ICD-10-CM

## 2018-06-19 DIAGNOSIS — E78.5 DYSLIPIDEMIA: ICD-10-CM

## 2018-06-19 PROCEDURE — 99213 OFFICE O/P EST LOW 20 MIN: CPT | Performed by: FAMILY MEDICINE

## 2018-06-19 PROCEDURE — 3008F BODY MASS INDEX DOCD: CPT | Performed by: FAMILY MEDICINE

## 2018-06-19 RX ORDER — COLCHICINE 0.6 MG/1
TABLET ORAL
Qty: 3 TABLET | Refills: 0 | Status: SHIPPED | OUTPATIENT
Start: 2018-06-19 | End: 2018-07-09 | Stop reason: SDUPTHER

## 2018-06-19 NOTE — PATIENT INSTRUCTIONS
Gout   WHAT YOU NEED TO KNOW:   Gout is a form of arthritis that causes severe joint pain, redness, swelling, and stiffness  Acute gout pain starts suddenly, gets worse quickly, and stops on its own  Acute gout can become chronic and cause permanent damage to the joints  DISCHARGE INSTRUCTIONS:   Return to the emergency department if:   · You have severe pain in one or more of your joints that you cannot tolerate  · You have a fever or redness that spreads beyond the joint area  Contact your healthcare provider if:   · You have new symptoms, such as a rash, after you start gout treatment  · Your joint pain and swelling do not go away, even after treatment  · You are not urinating as much or as often as you usually do  · You have trouble taking your gout medicines  · You have questions or concerns about your condition or care  Medicines: You may need any of the following:  · Prescription pain medicine  may be given  Ask your healthcare provider how to take this medicine safely  Some prescription pain medicines contain acetaminophen  Do not take other medicines that contain acetaminophen without talking to your healthcare provider  Too much acetaminophen may cause liver damage  Prescription pain medicine may cause constipation  Ask your healthcare provider how to prevent or treat constipation  · NSAIDs , such as ibuprofen, help decrease swelling, pain, and fever  This medicine is available with or without a doctor's order  NSAIDs can cause stomach bleeding or kidney problems in certain people  If you take blood thinner medicine, always ask your healthcare provider if NSAIDs are safe for you  Always read the medicine label and follow directions  · Gout medicine  decreases joint pain and swelling  It may also be given to prevent new gout attacks  · Steroids  reduce inflammation and can help your joint stiffness and pain during gout attacks      · Uric acid medicine  may be given to reduce the amount of uric acid your body makes  Some medicines may help you pass more uric acid when you urinate  · Take your medicine as directed  Contact your healthcare provider if you think your medicine is not helping or if you have side effects  Tell him or her if you are allergic to any medicine  Keep a list of the medicines, vitamins, and herbs you take  Include the amounts, and when and why you take them  Bring the list or the pill bottles to follow-up visits  Carry your medicine list with you in case of an emergency  Follow up with your healthcare provider as directed:  Write down your questions so you remember to ask them during your visits  Manage gout:   · Rest your painful joint so it can heal   Your healthcare provider may recommend crutches or a walker if the affected joint is in a leg  · Apply ice to your joint  Ice decreases pain and swelling  Use an ice pack, or put crushed ice in a plastic bag  Cover the ice pack or bag with a towel before you apply it to your painful joint  Apply ice for 15 to 20 minutes every hour, or as directed  · Elevate your joint  Elevation helps reduce swelling and pain  Raise your joint above the level of your heart as often as you can  Prop your painful joint on pillows to keep it above your heart comfortably  · Go to physical therapy if directed  A physical therapist can teach you exercises to improve flexibility and range of motion  Prevent gout attacks:   · Do not eat high-purine foods  These foods include meats, seafood, asparagus, spinach, cauliflower, and some types of beans  Healthcare providers may tell you to eat more low-fat milk products, such as yogurt  Milk products may decrease your risk for gout attacks  Vitamin C and coffee may also help  Your healthcare provider or dietitian can help you create a meal plan  · Drink more liquids as directed  Liquids such as water help remove uric acid from your body   Ask how much liquid to drink each day and which liquids are best for you  · Manage your weight  Weight loss may decrease the amount of uric acid in your body  Exercise can help you lose weight  Talk to your healthcare provider about the best exercises for you  · Control your blood sugar level if you have diabetes  Keep your blood sugar level in a normal range  This can help prevent gout attacks  · Limit or do not drink alcohol as directed  Alcohol can trigger a gout attack  Alcohol also increases your risk for dehydration  Ask your healthcare provider if alcohol is safe for you  © 2017 [address] [facility] Information is for End User's use only and may not be sold, redistributed or otherwise used for commercial purposes  All illustrations and images included in CareNotes® are the copyrighted property of [employer]  or William [employer]  The above information is an  only  It is not intended as medical advice for individual conditions or treatments  Talk to your doctor, nurse or pharmacist before following any medical regimen to see if it is safe and effective for you

## 2018-06-19 NOTE — PROGRESS NOTES
Assessment/Plan:     Diagnoses and all orders for this visit:    Gouty arthritis of right great toe  -     Uric acid; Future  -     Comprehensive metabolic panel  -     colchicine (COLCRYS) 0 6 mg tablet; Take 2tabs, then 1 tab 1 hr later  -     Uric acid    Dyslipidemia  -     Lipid Panel with Direct LDL reflex        [place] appears to have a bout of gout, which he reports is his first instance  Will treat with Colchicine instead of NSAIDs due to concern of previous renal surgery  Advised adequate fluid hydration in general and especially during treatment  Further advised not to consume large amounts of protein/purines, binge drinking alcohol  If no improvement, he will return for re-evaluation  He acknowledged understanding and agreement with the plan  Ordered overdue blood work  Subjective:      Patient ID: Vida Vicente is a 58 y o  male  [facility] is a 58year old male that comes to the office due to concerns of toe pain  Five days ago, he began experiencing right great toe pain during the middle of the night  The pain is constant, sharp and throbbing in quality, severe intensity, non-radiating  He has tried ice and Aleve with only some relief  Has not had similar episodes in the past  Denies increased beer/alcohol consumption, increased protein consumption, family history of gout  Admits to decrease fluid intake  Further denies fever, chills, N/V, GI issues  The following portions of the patient's history were reviewed and updated as appropriate: allergies, current medications, past family history, past medical history, past social history, past surgical history and problem list     Review of Systems   Constitutional: Negative for chills and fever  HENT: Negative for congestion, ear pain, rhinorrhea and sore throat  Eyes: Negative for visual disturbance  Respiratory: Negative for cough and shortness of breath      Cardiovascular: Negative for chest pain and palpitations  Gastrointestinal: Negative for abdominal pain, constipation, diarrhea, nausea and vomiting  Genitourinary: Negative for dysuria  Musculoskeletal: Positive for arthralgias (Toe pain)  Skin: Negative for rash  Neurological: Negative for headaches  Objective:      /80   Pulse 91   Temp 97 9 °F (36 6 °C)   Resp 20   Ht 5' 8" (1 727 m)   Wt 113 kg (250 lb)   SpO2 96%   BMI 38 01 kg/m²          Physical Exam   Constitutional: He is oriented to person, place, and time  He appears well-developed and well-nourished  No distress  HENT:   Head: Normocephalic and atraumatic  Right Ear: External ear normal    Left Ear: External ear normal    Eyes: Conjunctivae and EOM are normal  Pupils are equal, round, and reactive to light  Right eye exhibits no discharge  Left eye exhibits no discharge  Neck: Neck supple  Cardiovascular: Normal rate, regular rhythm and normal heart sounds  No murmur heard  Pulmonary/Chest: Effort normal and breath sounds normal  No respiratory distress  He has no wheezes  Abdominal: Soft  Bowel sounds are normal  There is no tenderness  Musculoskeletal: Normal range of motion  He exhibits tenderness (exquisite tenderness of right great toe to light palpation, associated with erythema)  He exhibits no edema or deformity  Neurological: He is alert and oriented to person, place, and time  Skin: Skin is warm and dry  He is not diaphoretic  Psychiatric: He has a normal mood and affect

## 2018-07-09 DIAGNOSIS — M10.9 GOUTY ARTHRITIS OF RIGHT GREAT TOE: ICD-10-CM

## 2018-07-10 DIAGNOSIS — M10.9 GOUTY ARTHRITIS OF RIGHT GREAT TOE: ICD-10-CM

## 2018-07-10 RX ORDER — COLCHICINE 0.6 MG/1
TABLET ORAL
Qty: 9 TABLET | Refills: 5 | Status: SHIPPED | OUTPATIENT
Start: 2018-07-10 | End: 2018-07-11 | Stop reason: SDUPTHER

## 2018-07-10 RX ORDER — COLCHICINE 0.6 MG/1
TABLET ORAL
Qty: 3 TABLET | Refills: 0 | OUTPATIENT
Start: 2018-07-10

## 2018-07-11 DIAGNOSIS — M10.9 GOUTY ARTHRITIS OF RIGHT GREAT TOE: ICD-10-CM

## 2018-07-11 RX ORDER — COLCHICINE 0.6 MG/1
TABLET ORAL
Qty: 9 TABLET | Refills: 3 | Status: SHIPPED | OUTPATIENT
Start: 2018-07-11 | End: 2018-07-11 | Stop reason: SDUPTHER

## 2018-07-11 RX ORDER — COLCHICINE 0.6 MG/1
TABLET ORAL
Qty: 3 TABLET | Refills: 0 | Status: SHIPPED | OUTPATIENT
Start: 2018-07-11 | End: 2018-07-14 | Stop reason: SDUPTHER

## 2018-07-11 NOTE — TELEPHONE ENCOUNTER
PT STOPPED IN OFFICE AND STATED THAT HE HAS CALLED SINCE Monday AND IS HAVING A GOUT FLAREUP, NEEDS MEDICINE TODAY PLEASE ADDRESS   Georgette Ormond

## 2018-07-13 LAB
ALBUMIN SERPL-MCNC: 3.8 G/DL (ref 3.6–4.8)
ALBUMIN/GLOB SERPL: 1.2 {RATIO} (ref 1.2–2.2)
ALP SERPL-CCNC: 79 IU/L (ref 39–117)
ALT SERPL-CCNC: 38 IU/L (ref 0–44)
AST SERPL-CCNC: 22 IU/L (ref 0–40)
BILIRUB SERPL-MCNC: 0.6 MG/DL (ref 0–1.2)
BUN SERPL-MCNC: 10 MG/DL (ref 8–27)
BUN/CREAT SERPL: 9 (ref 10–24)
CALCIUM SERPL-MCNC: 8.7 MG/DL (ref 8.6–10.2)
CHLORIDE SERPL-SCNC: 104 MMOL/L (ref 96–106)
CHOLEST SERPL-MCNC: 253 MG/DL (ref 100–199)
CO2 SERPL-SCNC: 22 MMOL/L (ref 20–29)
CREAT SERPL-MCNC: 1.08 MG/DL (ref 0.76–1.27)
GLOBULIN SER-MCNC: 3.2 G/DL (ref 1.5–4.5)
GLUCOSE SERPL-MCNC: 106 MG/DL (ref 65–99)
HDLC SERPL-MCNC: 40 MG/DL
LDLC SERPL CALC-MCNC: 136 MG/DL (ref 0–99)
LDLC/HDLC SERPL: 3.4 RATIO (ref 0–3.6)
POTASSIUM SERPL-SCNC: 4.1 MMOL/L (ref 3.5–5.2)
PROT SERPL-MCNC: 7 G/DL (ref 6–8.5)
SL AMB EGFR AFRICAN AMERICAN: 85 ML/MIN/1.73
SL AMB EGFR NON AFRICAN AMERICAN: 73 ML/MIN/1.73
SL AMB VLDL CHOLESTEROL CALC: 77 MG/DL (ref 5–40)
SODIUM SERPL-SCNC: 142 MMOL/L (ref 134–144)
TRIGL SERPL-MCNC: 385 MG/DL (ref 0–149)
URATE SERPL-MCNC: 7.9 MG/DL (ref 3.7–8.6)

## 2018-07-14 DIAGNOSIS — M10.9 GOUTY ARTHRITIS OF RIGHT GREAT TOE: ICD-10-CM

## 2018-07-14 RX ORDER — COLCHICINE 0.6 MG/1
TABLET ORAL
Qty: 3 TABLET | Refills: 0 | Status: SHIPPED | OUTPATIENT
Start: 2018-07-14 | End: 2018-07-31 | Stop reason: ALTCHOICE

## 2018-07-31 ENCOUNTER — OFFICE VISIT (OUTPATIENT)
Dept: FAMILY MEDICINE CLINIC | Facility: CLINIC | Age: 63
End: 2018-07-31
Payer: COMMERCIAL

## 2018-07-31 VITALS
BODY MASS INDEX: 39 KG/M2 | TEMPERATURE: 98.2 F | RESPIRATION RATE: 16 BRPM | SYSTOLIC BLOOD PRESSURE: 118 MMHG | DIASTOLIC BLOOD PRESSURE: 72 MMHG | WEIGHT: 256.5 LBS | HEART RATE: 72 BPM

## 2018-07-31 DIAGNOSIS — R73.9 HYPERGLYCEMIA: ICD-10-CM

## 2018-07-31 DIAGNOSIS — J43.1 PANLOBULAR EMPHYSEMA (HCC): ICD-10-CM

## 2018-07-31 DIAGNOSIS — E78.2 MIXED HYPERLIPIDEMIA: ICD-10-CM

## 2018-07-31 DIAGNOSIS — M10.9 GOUTY ARTHRITIS OF RIGHT GREAT TOE: ICD-10-CM

## 2018-07-31 DIAGNOSIS — Z12.11 SCREENING FOR COLON CANCER: ICD-10-CM

## 2018-07-31 DIAGNOSIS — M10.071 ACUTE IDIOPATHIC GOUT INVOLVING TOE OF RIGHT FOOT: Primary | ICD-10-CM

## 2018-07-31 PROCEDURE — 99214 OFFICE O/P EST MOD 30 MIN: CPT | Performed by: FAMILY MEDICINE

## 2018-07-31 RX ORDER — FEBUXOSTAT 40 MG/1
40 TABLET, FILM COATED ORAL DAILY
Qty: 30 TABLET | Refills: 5 | Status: SHIPPED | OUTPATIENT
Start: 2018-07-31 | End: 2019-02-20 | Stop reason: SDUPTHER

## 2018-07-31 RX ORDER — COLCHICINE 0.6 MG/1
0.6 TABLET ORAL DAILY
Qty: 15 TABLET | Refills: 1 | Status: SHIPPED | OUTPATIENT
Start: 2018-07-31

## 2018-07-31 NOTE — PROGRESS NOTES
Assessment/Plan:    Will stop atorvastatin 20  Start colchicine and uloric  Instructions  Will start atorvastatin 40 when finishes colchicine  Repeat labs  6 weeks   Needs to lose weight      Diagnoses and all orders for this visit:    Acute idiopathic gout involving toe of right foot  -     colchicine (COLCRYS) 0 6 mg tablet; Take 1 tablet (0 6 mg total) by mouth daily  -     febuxostat (ULORIC) 40 mg tablet; Take 1 tablet (40 mg total) by mouth daily    Screening for colon cancer  -     Ambulatory referral to Gastroenterology; Future    Gouty arthritis of right great toe    Panlobular emphysema (HCC)    Mixed hyperlipidemia    Hyperglycemia          Subjective:      Patient ID: Nena Henriquez is a 58 y o  male  Patient seen in f/u had gout attack  Big toe  Seen in er Placed on meds and improved then   Had recurred took no meds  Went away in about a week  No prev hx  On no meds for gout   Did have  labs  And uric acid was 7 9 while chol and trig  Significantly elev even though on atorvastatin 20 od  Otherwise no new issues        The following portions of the patient's history were reviewed and updated as appropriate: past family history, past medical history, past social history and past surgical history  Review of Systems   Constitutional: Negative  HENT: Negative  Eyes: Negative  Respiratory: Negative  Cardiovascular:        See hpi   Gastrointestinal: Negative  Endocrine:        Hyperglycemia   Musculoskeletal:        Rt large toe  Site of gout flare   Skin: Negative  Neurological: Negative  Psychiatric/Behavioral: Negative  Objective:      /72   Pulse 72   Temp 98 2 °F (36 8 °C)   Resp 16   Wt 116 kg (256 lb 8 oz)   BMI 39 00 kg/m²          Physical Exam   Constitutional: He is oriented to person, place, and time  He appears well-developed and well-nourished  HENT:   Head: Normocephalic and atraumatic     Right Ear: External ear normal    Left Ear: External ear normal    Eyes: Pupils are equal, round, and reactive to light  Neck: Normal range of motion  Neck supple  Cardiovascular: Normal rate, regular rhythm and normal heart sounds  Pulmonary/Chest: Effort normal    Abdominal: Soft  Musculoskeletal: Normal range of motion  He exhibits no edema or tenderness  Neurological: He is alert and oriented to person, place, and time  Skin: Skin is warm  Psychiatric: He has a normal mood and affect   His behavior is normal

## 2018-09-21 ENCOUNTER — OFFICE VISIT (OUTPATIENT)
Dept: FAMILY MEDICINE CLINIC | Facility: CLINIC | Age: 63
End: 2018-09-21
Payer: COMMERCIAL

## 2018-09-21 VITALS
SYSTOLIC BLOOD PRESSURE: 130 MMHG | HEART RATE: 80 BPM | BODY MASS INDEX: 39.43 KG/M2 | WEIGHT: 259.3 LBS | TEMPERATURE: 97.8 F | DIASTOLIC BLOOD PRESSURE: 70 MMHG | OXYGEN SATURATION: 97 %

## 2018-09-21 DIAGNOSIS — R05.9 COUGH IN ADULT: ICD-10-CM

## 2018-09-21 DIAGNOSIS — J01.40 ACUTE NON-RECURRENT PANSINUSITIS: Primary | ICD-10-CM

## 2018-09-21 PROCEDURE — 99213 OFFICE O/P EST LOW 20 MIN: CPT | Performed by: NURSE PRACTITIONER

## 2018-09-21 PROCEDURE — 1036F TOBACCO NON-USER: CPT | Performed by: NURSE PRACTITIONER

## 2018-09-21 RX ORDER — FLUTICASONE PROPIONATE 50 MCG
2 SPRAY, SUSPENSION (ML) NASAL DAILY
Qty: 16 G | Refills: 0 | Status: SHIPPED | OUTPATIENT
Start: 2018-09-21 | End: 2019-02-20 | Stop reason: SDUPTHER

## 2018-09-21 RX ORDER — BENZONATATE 100 MG/1
100 CAPSULE ORAL 2 TIMES DAILY PRN
Qty: 20 CAPSULE | Refills: 0 | Status: SHIPPED | OUTPATIENT
Start: 2018-09-21 | End: 2018-09-26

## 2018-09-21 RX ORDER — ALBUTEROL SULFATE 90 UG/1
2 AEROSOL, METERED RESPIRATORY (INHALATION) EVERY 6 HOURS PRN
Qty: 1 INHALER | Refills: 0 | Status: SHIPPED | OUTPATIENT
Start: 2018-09-21 | End: 2019-02-20 | Stop reason: SDUPTHER

## 2018-09-21 NOTE — PROGRESS NOTES
Assessment/Plan:  1  Take medications as prescribed  2  Follow-up condition changes or worsens       Diagnoses and all orders for this visit:    Acute non-recurrent pansinusitis  -     fluticasone (FLONASE) 50 mcg/act nasal spray; 2 sprays into each nostril daily    Cough in adult  -     albuterol (VENTOLIN HFA) 90 mcg/act inhaler; Inhale 2 puffs every 6 (six) hours as needed for wheezing  -     benzonatate (TESSALON PERLES) 100 mg capsule; Take 1 capsule (100 mg total) by mouth 2 (two) times a day as needed for cough for up to 5 days          Subjective:      Patient ID: Sade Celestin is a 58 y o  male  A 77-year-old male presents with cough and sneezing for couple of days  Sore throat  Chest congestion  Has history of COPD  Denies medications  Feels achy  Runny nose  The following portions of the patient's history were reviewed and updated as appropriate: allergies and current medications  Review of Systems   Constitutional: Negative  HENT: Positive for congestion, sinus pressure and sore throat  Respiratory: Positive for cough  Cardiovascular: Negative  Objective:      /70 (BP Location: Left arm, Patient Position: Sitting, Cuff Size: Large)   Pulse 80   Temp 97 8 °F (36 6 °C) (Tympanic)   Wt 118 kg (259 lb 4 8 oz)   SpO2 97%   BMI 39 43 kg/m²          Physical Exam   Constitutional: He appears well-developed and well-nourished  HENT:   Head: Normocephalic and atraumatic  Right Ear: External ear normal    Left Ear: External ear normal    Nose: Right sinus exhibits maxillary sinus tenderness and frontal sinus tenderness  Left sinus exhibits maxillary sinus tenderness and frontal sinus tenderness     Clear post nasal drip     Pulmonary/Chest: Effort normal and breath sounds normal    Cough/dry

## 2019-02-19 ENCOUNTER — TELEPHONE (OUTPATIENT)
Dept: FAMILY MEDICINE CLINIC | Facility: CLINIC | Age: 64
End: 2019-02-19

## 2019-02-19 ENCOUNTER — OFFICE VISIT (OUTPATIENT)
Dept: FAMILY MEDICINE CLINIC | Facility: CLINIC | Age: 64
End: 2019-02-19
Payer: COMMERCIAL

## 2019-02-19 VITALS
DIASTOLIC BLOOD PRESSURE: 78 MMHG | BODY MASS INDEX: 38.69 KG/M2 | OXYGEN SATURATION: 95 % | HEART RATE: 77 BPM | TEMPERATURE: 98.8 F | SYSTOLIC BLOOD PRESSURE: 150 MMHG | WEIGHT: 255.31 LBS | HEIGHT: 68 IN

## 2019-02-19 DIAGNOSIS — M10.9 GOUTY ARTHRITIS OF RIGHT GREAT TOE: ICD-10-CM

## 2019-02-19 DIAGNOSIS — Z12.11 SCREENING FOR COLON CANCER: ICD-10-CM

## 2019-02-19 DIAGNOSIS — E66.9 OBESITY (BMI 35.0-39.9 WITHOUT COMORBIDITY): ICD-10-CM

## 2019-02-19 DIAGNOSIS — E78.2 MIXED HYPERLIPIDEMIA: Primary | ICD-10-CM

## 2019-02-19 DIAGNOSIS — R35.0 URINARY FREQUENCY: ICD-10-CM

## 2019-02-19 DIAGNOSIS — K21.9 GASTROESOPHAGEAL REFLUX DISEASE, ESOPHAGITIS PRESENCE NOT SPECIFIED: ICD-10-CM

## 2019-02-19 DIAGNOSIS — J43.1 PANLOBULAR EMPHYSEMA (HCC): ICD-10-CM

## 2019-02-19 PROBLEM — E78.00 HYPERCHOLESTEROLEMIA: Status: ACTIVE | Noted: 2018-12-14

## 2019-02-19 LAB
SL AMB  POCT GLUCOSE, UA: NORMAL
SL AMB LEUKOCYTE ESTERASE,UA: 75
SL AMB POCT BILIRUBIN,UA: NEGATIVE
SL AMB POCT BLOOD,UA: NEGATIVE
SL AMB POCT CLARITY,UA: ABNORMAL
SL AMB POCT COLOR,UA: YELLOW
SL AMB POCT KETONES,UA: NEGATIVE
SL AMB POCT NITRITE,UA: NEGATIVE
SL AMB POCT PH,UA: 5
SL AMB POCT SPECIFIC GRAVITY,UA: 1.02
SL AMB POCT URINE PROTEIN: NEGATIVE
SL AMB POCT UROBILINOGEN: NORMAL

## 2019-02-19 PROCEDURE — 1036F TOBACCO NON-USER: CPT | Performed by: FAMILY MEDICINE

## 2019-02-19 PROCEDURE — 99214 OFFICE O/P EST MOD 30 MIN: CPT | Performed by: FAMILY MEDICINE

## 2019-02-19 PROCEDURE — 81002 URINALYSIS NONAUTO W/O SCOPE: CPT | Performed by: FAMILY MEDICINE

## 2019-02-19 PROCEDURE — 3008F BODY MASS INDEX DOCD: CPT | Performed by: FAMILY MEDICINE

## 2019-02-19 RX ORDER — FLUOCINOLONE ACETONIDE 0.11 MG/ML
OIL AURICULAR (OTIC)
COMMUNITY

## 2019-02-19 RX ORDER — ATORVASTATIN CALCIUM 20 MG/1
20 TABLET, FILM COATED ORAL DAILY
Qty: 90 TABLET | Refills: 3 | Status: SHIPPED | OUTPATIENT
Start: 2019-02-19 | End: 2019-02-20 | Stop reason: SDUPTHER

## 2019-02-19 RX ORDER — FLUOCINOLONE ACETONIDE 0.11 MG/ML
OIL AURICULAR (OTIC)
Refills: 3 | COMMUNITY
Start: 2018-12-15 | End: 2019-02-19 | Stop reason: SDUPTHER

## 2019-02-19 NOTE — PATIENT INSTRUCTIONS

## 2019-02-19 NOTE — PROGRESS NOTES
SUBJECTIVE:  2019 Ashia Zhao MD  I have identified this patient to be [facility],  -1955, MR# [id number]    Chief complaint: f/u gout, COPD and other chronic disease  Plans to retire soon and to take better care of himself  Review of systems:  No fever/chills/sweats/unexplained weight loss  No chest pain/shortness of breatah  No change in digestion or bowel movenets  No change in urination  No new musculoskeletal or neurological symptoms      Chart reviewed for relevant medical, surgical and psychosocial history, medications and allergies  Patient Active Problem List   Diagnosis    Cough    COPD (chronic obstructive pulmonary disease) (HCC)    Hyperglycemia    Seborrheic keratoses    TMJ (temporomandibular joint syndrome)    Dyspnea on exertion    Mixed hyperlipidemia    Septic bursitis of elbow, right    Gouty arthritis of right great toe    Screening for colon cancer    Acute idiopathic gout involving toe of right foot    Gastroesophageal reflux disease    Hypercholesterolemia       EXAM:  /78   Pulse 77   Temp 98 8 °F (37 1 °C)   Ht 5' 8" (1 727 m)   Wt 116 kg (255 lb 5 oz)   SpO2 95%   BMI 38 82 kg/m²   The patient appears well, in no apparent distress  Alert and oriented times three, pleasant and cooperative  Vital signs are as noted by the nurse      Eyes: well perfused conjunctiva, not clinically pale, not jaundiced  Neck: supple, trachea in the midline and no cervical lymphadenopathy  Lungs: clear to auscultation and percussion  Heart: Regular rate and rhythm, no gallop, no murmurs  Abdomen: soft, non-tender or distended, no guarding, rebound, normal bowel sounds, no masses  Skin: good capillary refill,no rashes noted  Extremities: Good power and tone all extremities bilaterally  No pedal edema      ASSESSMENT/PLAN:  BMI Counseling: Body mass index is 38 82 kg/m²  Discussed the patient's BMI with him  The BMI is above average   BMI counseling and education was provided to the patient  Nutrition recommendations include 3-5 servings of fruits/vegetables daily and moderation in carbohydrate intake  1  Panlobular emphysema ([address] )  stable  - Comprehensive metabolic panel; Future  - Comprehensive metabolic panel    2  Mixed hyperlipidemia  monitor  - atorvastatin (LIPITOR) 20 mg tablet; Take 1 tablet (20 mg total) by mouth daily  Dispense: 90 tablet; Refill: 3  - Lipid panel; Future  - Comprehensive metabolic panel; Future  - Lipid panel  - Comprehensive metabolic panel    3  Urinary frequency    - POCT urine dip - benign    4  Gouty arthritis of right great toe  stable  - Uric acid; Future  - Comprehensive metabolic panel; Future  - Uric acid  - Comprehensive metabolic panel    5  Gastroesophageal reflux disease, esophagitis presence not specified  Will improve diet    6  Screening for colon cancer    - Ambulatory referral to Gastroenterology; Future    7   Obesity (BMI 35 0-39 9 without comorbidity)  Goal is 1/2 plate veggies    Future Appointments   Date Time Provider Pil[place] Auguste   5/20/2019  9:30 AM Juliane Mcgee MD [facility]

## 2019-02-20 DIAGNOSIS — E78.2 MIXED HYPERLIPIDEMIA: ICD-10-CM

## 2019-02-20 DIAGNOSIS — J01.40 ACUTE NON-RECURRENT PANSINUSITIS: ICD-10-CM

## 2019-02-20 DIAGNOSIS — R05.9 COUGH IN ADULT: ICD-10-CM

## 2019-02-20 DIAGNOSIS — M10.071 ACUTE IDIOPATHIC GOUT INVOLVING TOE OF RIGHT FOOT: ICD-10-CM

## 2019-02-20 RX ORDER — ATORVASTATIN CALCIUM 20 MG/1
20 TABLET, FILM COATED ORAL DAILY
Qty: 90 TABLET | Refills: 3 | Status: SHIPPED | OUTPATIENT
Start: 2019-02-20 | End: 2020-03-24 | Stop reason: SDUPTHER

## 2019-02-20 RX ORDER — FEBUXOSTAT 40 MG/1
40 TABLET, FILM COATED ORAL DAILY
Qty: 30 TABLET | Refills: 5 | Status: SHIPPED | OUTPATIENT
Start: 2019-02-20 | End: 2020-06-10 | Stop reason: SDUPTHER

## 2019-02-20 RX ORDER — ALBUTEROL SULFATE 90 UG/1
2 AEROSOL, METERED RESPIRATORY (INHALATION) EVERY 6 HOURS PRN
Qty: 1 INHALER | Refills: 0 | Status: SHIPPED | OUTPATIENT
Start: 2019-02-20 | End: 2020-03-24 | Stop reason: SDUPTHER

## 2019-02-20 RX ORDER — FLUTICASONE PROPIONATE 50 MCG
2 SPRAY, SUSPENSION (ML) NASAL DAILY
Qty: 16 G | Refills: 0 | Status: SHIPPED | OUTPATIENT
Start: 2019-02-20

## 2019-02-21 LAB
BACTERIA UR CULT: NORMAL
Lab: NO GROWTH

## 2019-02-27 LAB
ALBUMIN SERPL-MCNC: 4.1 G/DL (ref 3.6–4.8)
ALBUMIN/GLOB SERPL: 1.5 {RATIO} (ref 1.2–2.2)
ALP SERPL-CCNC: 77 IU/L (ref 39–117)
ALT SERPL-CCNC: 55 IU/L (ref 0–44)
AST SERPL-CCNC: 33 IU/L (ref 0–40)
BILIRUB SERPL-MCNC: 0.5 MG/DL (ref 0–1.2)
BUN SERPL-MCNC: 10 MG/DL (ref 8–27)
BUN/CREAT SERPL: 10 (ref 10–24)
CALCIUM SERPL-MCNC: 8.8 MG/DL (ref 8.6–10.2)
CHLORIDE SERPL-SCNC: 107 MMOL/L (ref 96–106)
CHOLEST SERPL-MCNC: 287 MG/DL (ref 100–199)
CHOLEST/HDLC SERPL: 7 RATIO (ref 0–5)
CO2 SERPL-SCNC: 22 MMOL/L (ref 20–29)
CREAT SERPL-MCNC: 0.98 MG/DL (ref 0.76–1.27)
GLOBULIN SER-MCNC: 2.7 G/DL (ref 1.5–4.5)
GLUCOSE SERPL-MCNC: 102 MG/DL (ref 65–99)
HDLC SERPL-MCNC: 41 MG/DL
LDLC SERPL CALC-MCNC: 182 MG/DL (ref 0–99)
LDLC SERPL DIRECT ASSAY-MCNC: 186 MG/DL (ref 0–99)
POTASSIUM SERPL-SCNC: 4.1 MMOL/L (ref 3.5–5.2)
PROT SERPL-MCNC: 6.8 G/DL (ref 6–8.5)
SL AMB EGFR AFRICAN AMERICAN: 94 ML/MIN/1.73
SL AMB EGFR NON AFRICAN AMERICAN: 82 ML/MIN/1.73
SL AMB VLDL CHOLESTEROL CALC: 64 MG/DL (ref 5–40)
SODIUM SERPL-SCNC: 145 MMOL/L (ref 134–144)
TRIGL SERPL-MCNC: 321 MG/DL (ref 0–149)
URATE SERPL-MCNC: 8.5 MG/DL (ref 3.7–8.6)

## 2019-03-06 ENCOUNTER — TELEPHONE (OUTPATIENT)
Dept: FAMILY MEDICINE CLINIC | Facility: CLINIC | Age: 64
End: 2019-03-06

## 2019-04-25 ENCOUNTER — TELEPHONE (OUTPATIENT)
Dept: FAMILY MEDICINE CLINIC | Facility: CLINIC | Age: 64
End: 2019-04-25

## 2019-06-28 ENCOUNTER — OFFICE VISIT (OUTPATIENT)
Dept: UROLOGY | Facility: MEDICAL CENTER | Age: 64
End: 2019-06-28
Payer: COMMERCIAL

## 2019-06-28 VITALS
HEIGHT: 68 IN | HEART RATE: 81 BPM | BODY MASS INDEX: 38.65 KG/M2 | SYSTOLIC BLOOD PRESSURE: 136 MMHG | DIASTOLIC BLOOD PRESSURE: 76 MMHG | WEIGHT: 255 LBS

## 2019-06-28 DIAGNOSIS — N13.8 BPH WITH URINARY OBSTRUCTION: ICD-10-CM

## 2019-06-28 DIAGNOSIS — R31.29 MICROSCOPIC HEMATURIA: ICD-10-CM

## 2019-06-28 DIAGNOSIS — Z85.528 HISTORY OF KIDNEY CANCER: ICD-10-CM

## 2019-06-28 DIAGNOSIS — N40.1 BPH WITH URINARY OBSTRUCTION: ICD-10-CM

## 2019-06-28 DIAGNOSIS — R10.9 FLANK PAIN: Primary | ICD-10-CM

## 2019-06-28 LAB
SL AMB  POCT GLUCOSE, UA: NORMAL
SL AMB LEUKOCYTE ESTERASE,UA: NORMAL
SL AMB POCT BILIRUBIN,UA: NORMAL
SL AMB POCT BLOOD,UA: NORMAL
SL AMB POCT CLARITY,UA: CLEAR
SL AMB POCT COLOR,UA: YELLOW
SL AMB POCT KETONES,UA: NORMAL
SL AMB POCT NITRITE,UA: NORMAL
SL AMB POCT PH,UA: 5.5
SL AMB POCT SPECIFIC GRAVITY,UA: 1.01
SL AMB POCT URINE PROTEIN: NORMAL
SL AMB POCT UROBILINOGEN: 0.2

## 2019-06-28 PROCEDURE — 81003 URINALYSIS AUTO W/O SCOPE: CPT | Performed by: UROLOGY

## 2019-06-28 PROCEDURE — 99244 OFF/OP CNSLTJ NEW/EST MOD 40: CPT | Performed by: UROLOGY

## 2019-06-28 RX ORDER — ESOMEPRAZOLE MAGNESIUM 40 MG/1
CAPSULE, DELAYED RELEASE ORAL
COMMUNITY

## 2019-06-28 RX ORDER — HYDROCODONE BITARTRATE AND ACETAMINOPHEN 5; 325 MG/1; MG/1
1-2 TABLET ORAL EVERY 6 HOURS PRN
Qty: 10 TABLET | Refills: 0 | Status: SHIPPED | OUTPATIENT
Start: 2019-06-28

## 2019-07-10 ENCOUNTER — HOSPITAL ENCOUNTER (OUTPATIENT)
Dept: CT IMAGING | Facility: HOSPITAL | Age: 64
Discharge: HOME | End: 2019-07-10
Attending: UROLOGY
Payer: COMMERCIAL

## 2019-07-10 DIAGNOSIS — R10.9 FLANK PAIN: ICD-10-CM

## 2019-07-10 PROCEDURE — 74176 CT ABD & PELVIS W/O CONTRAST: CPT

## 2019-07-15 ENCOUNTER — TELEPHONE (OUTPATIENT)
Dept: UROLOGY | Facility: MEDICAL CENTER | Age: 64
End: 2019-07-15

## 2019-07-15 NOTE — TELEPHONE ENCOUNTER
Antony Nagy from [facility] called  Results of patient's CT Abdomen Pelvis are now available in Epic

## 2019-07-16 DIAGNOSIS — N28.89 RENAL MASS: Primary | ICD-10-CM

## 2019-07-16 NOTE — TELEPHONE ENCOUNTER
LMOM for pt to call back   Dr Carmen Mckinney put in a order for a BMP and a CT scan w and w/o contrast  [facility] for the test and then direct to clerical

## 2019-07-17 NOTE — TELEPHONE ENCOUNTER
Notified pt of results per Dr Kayden Mckeon  Pt prefers to have ct scan done at [facility] and can be scheduled for any time   Will route to clerical

## 2019-07-17 NOTE — TELEPHONE ENCOUNTER
PT SCHEDULED FOR CT SCAN AT [facility] ON FRI, 7/26/19, AT 9 PM   PT AWARE  TOLD PT NO SOLIDS 3 HRS BEFORE, JUST CLEAR LIQUIDS  IV CONTRAST ONLY  PT IS SELF PAY        PT ALSO AWARE HE NEEDS BLOOD WORK BEFORE CT SCAN

## 2019-07-23 ENCOUNTER — APPOINTMENT (OUTPATIENT)
Dept: LAB | Facility: CLINIC | Age: 64
End: 2019-07-23

## 2019-07-23 DIAGNOSIS — N28.89 RENAL MASS: ICD-10-CM

## 2019-07-23 LAB
ANION GAP SERPL CALCULATED.3IONS-SCNC: 12 MMOL/L (ref 4–13)
BUN SERPL-MCNC: 13 MG/DL (ref 5–25)
CALCIUM SERPL-MCNC: 9 MG/DL (ref 8.3–10.1)
CHLORIDE SERPL-SCNC: 104 MMOL/L (ref 100–108)
CO2 SERPL-SCNC: 24 MMOL/L (ref 21–32)
CREAT SERPL-MCNC: 1.28 MG/DL (ref 0.6–1.3)
GFR SERPL CREATININE-BSD FRML MDRD: 59 ML/MIN/1.73SQ M
GLUCOSE SERPL-MCNC: 126 MG/DL (ref 65–140)
POTASSIUM SERPL-SCNC: 3.8 MMOL/L (ref 3.5–5.3)
SODIUM SERPL-SCNC: 140 MMOL/L (ref 136–145)

## 2019-07-23 PROCEDURE — 36415 COLL VENOUS BLD VENIPUNCTURE: CPT

## 2019-07-23 PROCEDURE — 80048 BASIC METABOLIC PNL TOTAL CA: CPT

## 2019-07-25 ENCOUNTER — TELEPHONE (OUTPATIENT)
Dept: UROLOGY | Facility: MEDICAL CENTER | Age: 64
End: 2019-07-25

## 2019-07-25 DIAGNOSIS — R31.29 MICROSCOPIC HEMATURIA: Primary | ICD-10-CM

## 2019-07-25 NOTE — TELEPHONE ENCOUNTER
Patient of Dr Kirstin Moreira seen on the [facility] office  Patient called for his results for his catscan  Please advise

## 2019-07-25 NOTE — TELEPHONE ENCOUNTER
Called pt  He wanted the results of his first CT Scan  Dr Margot Clark ordered another Ct Scan and he doesn't want to do it unless he really has to  He does not have insurance  Qasim Brown, can you please call the pt and discuss his CT Scan and the need for another one?

## 2019-07-25 NOTE — TELEPHONE ENCOUNTER
I called the patient 07/25/19 12:58 PM and reviewed the results of his initial CT scan which was a stone study  The concerning finding was the right renal cyst necessitating a CT renal mass protocol  I reviewed this with the patient  He had his BMP done yesterday which shows a GFR of 59 so he is okay to go for the contrasted study  Patient canceled the CT scan which was post before tomorrow because he does not have the money right now as he is a self-pay  I offered to reschedule his CT scan appointment, patient states he will call [facility] when he has the money but does not want us to schedule it for him right now  New order is in  I reiterated to patient not to prolong too long as really should identify the complex right renal cyst especially given his history of prior partial nephrectomy on that side as well as microscopic hematuria at last visit  He expresses understanding and will notify us if he would like assistance in the future with scheduling but would like to wait until next month

## 2020-03-24 ENCOUNTER — TELEMEDICINE (OUTPATIENT)
Dept: FAMILY MEDICINE CLINIC | Facility: CLINIC | Age: 65
End: 2020-03-24

## 2020-03-24 ENCOUNTER — TELEPHONE (OUTPATIENT)
Dept: FAMILY MEDICINE CLINIC | Facility: CLINIC | Age: 65
End: 2020-03-24

## 2020-03-24 DIAGNOSIS — R05.9 COUGH IN ADULT: ICD-10-CM

## 2020-03-24 DIAGNOSIS — E78.2 MIXED HYPERLIPIDEMIA: ICD-10-CM

## 2020-03-24 PROCEDURE — G2012 BRIEF CHECK IN BY MD/QHP: HCPCS | Performed by: FAMILY MEDICINE

## 2020-03-24 RX ORDER — ALBUTEROL SULFATE 90 UG/1
2 AEROSOL, METERED RESPIRATORY (INHALATION) EVERY 6 HOURS PRN
Qty: 1 INHALER | Refills: 0 | Status: SHIPPED | OUTPATIENT
Start: 2020-03-24

## 2020-03-24 RX ORDER — ATORVASTATIN CALCIUM 20 MG/1
20 TABLET, FILM COATED ORAL DAILY
Qty: 90 TABLET | Refills: 3 | Status: SHIPPED | OUTPATIENT
Start: 2020-03-24

## 2020-03-25 NOTE — PROGRESS NOTES
Virtual Regular Visit  Unclear etiology at this time, possibly secondary to COPD exacerbations vs  Angina vs  CHF  Patient is currently asymptomatic, will continue to observe at this time, and advise to use albuterol as needed for shortness of breath or wheezing  If shortness of breath or wheezing persists despite albuterol, will prescribe steroids for COPD exacerbation  If chest discomfort returns and does not resolve with changing of position, advised to go to ED for further evaluation  Problem List Items Addressed This Visit        Other    Mixed hyperlipidemia    Relevant Medications    atorvastatin (LIPITOR) 20 mg tablet      Other Visit Diagnoses     Cough in adult        Relevant Medications    albuterol (Ventolin HFA) 90 mcg/act inhaler               Reason for visit is episode of shortness of breath and chest discomfort    Encounter provider Dhiraj Saunders MD    Provider located at [address] [place] [id number]      Recent Visits  Date Type Provider Dept   03/24/20 Telephone Zhang Henry [facility]   Showing recent visits within past 7 days and meeting all other requirements     Future Appointments  No visits were found meeting these conditions  Showing future appointments within next 150 days and meeting all other requirements        After connecting through "[employer]", the patient was identified by name and date of birth  Alphonselarry Alvarado was informed that this is a telemedicine visit and that the visit is being conducted through telephone which may not be secure and therefore, might not be HIPAA-compliant  My office door was closed  No one else was in the room  He acknowledged consent and understanding of privacy and security of the video platform  The patient has agreed to participate and understands they can discontinue the visit at any time      Nestor Hallman is a 59 y o  male with history of COPD, hyperlipidemia,  and gout who presents via phone virtual visit for episode of shortness of breath, wheezing, and chest discomfort yesterday evening while trying to sleep on left side  Patient noticed pain that developed in generalized chest area while attempting to take a deep breath  He also heard audible wheezing from his lungs  Symptoms resolved upon sitting up and walking around  Since then, patient denies any further episodes, no chest pain with exertion, cough, or fever  Denies any swelling in the lower extremities  Patient has COPD controlled with albuterol inhaler  No history of coronary artery diease, MI, or heart failure        Past Medical History:   Diagnosis Date    Cancer [facility])     last assessed [id number] Kidney area cancer    Lung disease     last assessed [id number]       Past Surgical History:   Procedure Laterality Date    COLONOSCOPY      KIDNEY SURGERY      last assessed [id number]    LIPOMA RESECTION         Current Outpatient Medications   Medication Sig Dispense Refill    albuterol (Ventolin HFA) 90 mcg/act inhaler Inhale 2 puffs every 6 (six) hours as needed for wheezing 1 Inhaler 0    aspirin 81 MG tablet Take 81 mg by mouth      atorvastatin (LIPITOR) 20 mg tablet Take 1 tablet (20 mg total) by mouth daily 90 tablet 3    colchicine (COLCRYS) 0 6 mg tablet Take 1 tablet (0 6 mg total) by mouth daily (Patient not taking: Reported on 2/19/2019) 15 tablet 1    diclofenac sodium (VOLTAREN) 50 mg EC tablet TAKE ONE TAB BY MOUTH TWICE A DAY AS NEEDED FOR PAIN  0    esomeprazole (NexIUM) 40 MG capsule esomeprazole magnesium 40 mg capsule,delayed release      febuxostat (ULORIC) 40 mg tablet Take 1 tablet (40 mg total) by mouth daily 30 tablet 5    fluocinolone acetonide (DERMOTIC) 0 01 % otic oil fluocinolone acetonide oil 0 01 % ear drops   INSTILL 1-2 DROPS INTO AFFECTED EAR(S) BY OTIC ROUTE 2 TIMES PER DAY AS NEEDED      fluticasone (FLONASE) 50 mcg/act nasal spray 2 sprays into each nostril daily 16 g 0    HYDROcodone-acetaminophen (NORCO) 5-325 mg per tablet Take 1-2 tablets by mouth every 6 (six) hours as needed for painMax Daily Amount: 8 tablets 10 tablet 0     No current facility-administered medications for this visit  No Known Allergies    Review of Systems   Constitutional: Negative for activity change, chills, fatigue and fever  HENT: Negative for congestion, rhinorrhea and sore throat  Eyes: Negative for visual disturbance  Respiratory: Negative for cough, shortness of breath and wheezing  Cardiovascular: Negative for chest pain, palpitations and leg swelling  Gastrointestinal: Negative for abdominal pain, diarrhea, nausea and vomiting  Genitourinary: Negative for dysuria  Musculoskeletal: Negative for arthralgias  Skin: Negative for rash and wound  Neurological: Negative for dizziness, weakness and light-headedness  Psychiatric/Behavioral: Negative for suicidal ideas  I spent 25 minutes with the patient during this visit

## 2020-06-10 DIAGNOSIS — M10.071 ACUTE IDIOPATHIC GOUT INVOLVING TOE OF RIGHT FOOT: ICD-10-CM

## 2020-06-11 RX ORDER — FEBUXOSTAT 40 MG/1
40 TABLET, FILM COATED ORAL DAILY
Qty: 30 TABLET | Refills: 5 | Status: SHIPPED | OUTPATIENT
Start: 2020-06-11

## 2021-07-15 ENCOUNTER — HOSPITAL ENCOUNTER (OUTPATIENT)
Dept: CT IMAGING | Facility: HOSPITAL | Age: 66
Discharge: HOME | End: 2021-07-15
Payer: MEDICARE

## 2021-07-15 DIAGNOSIS — N28.1 CYST OF KIDNEY, ACQUIRED: ICD-10-CM

## 2021-07-15 PROCEDURE — 74177 CT ABD & PELVIS W/CONTRAST: CPT

## 2021-07-15 PROCEDURE — G1004 CDSM NDSC: HCPCS

## 2021-07-15 RX ADMIN — IOHEXOL 100 ML: 350 INJECTION, SOLUTION INTRAVENOUS at 19:57

## 2021-08-22 ENCOUNTER — APPOINTMENT (EMERGENCY)
Dept: RADIOLOGY | Facility: HOSPITAL | Age: 66
End: 2021-08-22
Payer: MEDICARE

## 2021-08-22 ENCOUNTER — HOSPITAL ENCOUNTER (EMERGENCY)
Facility: HOSPITAL | Age: 66
Discharge: HOME | End: 2021-08-22
Attending: EMERGENCY MEDICINE | Admitting: EMERGENCY MEDICINE
Payer: MEDICARE

## 2021-08-22 VITALS
DIASTOLIC BLOOD PRESSURE: 81 MMHG | OXYGEN SATURATION: 97 % | RESPIRATION RATE: 20 BRPM | TEMPERATURE: 102.1 F | SYSTOLIC BLOOD PRESSURE: 136 MMHG | HEART RATE: 88 BPM

## 2021-08-22 DIAGNOSIS — U07.1 COVID-19: Primary | ICD-10-CM

## 2021-08-22 LAB
ALBUMIN SERPL BCP-MCNC: 3.6 G/DL (ref 3.5–5)
ALP SERPL-CCNC: 83 U/L (ref 46–116)
ALT SERPL W P-5'-P-CCNC: 103 U/L (ref 12–78)
ANION GAP SERPL CALCULATED.3IONS-SCNC: 11 MMOL/L (ref 4–13)
APTT PPP: 29 SECONDS (ref 23–37)
AST SERPL W P-5'-P-CCNC: 50 U/L (ref 5–45)
BASOPHILS # BLD AUTO: 0.02 THOUSANDS/ΜL (ref 0–0.1)
BASOPHILS NFR BLD AUTO: 0 % (ref 0–1)
BILIRUB SERPL-MCNC: 0.98 MG/DL (ref 0.2–1)
BUN SERPL-MCNC: 13 MG/DL (ref 5–25)
CALCIUM SERPL-MCNC: 8.6 MG/DL (ref 8.3–10.1)
CHLORIDE SERPL-SCNC: 105 MMOL/L (ref 100–108)
CK MB SERPL-MCNC: 0.8 NG/ML (ref 0–5)
CK MB SERPL-MCNC: <1 % (ref 0–2.5)
CK SERPL-CCNC: 414 U/L (ref 39–308)
CO2 SERPL-SCNC: 26 MMOL/L (ref 21–32)
CREAT SERPL-MCNC: 1.5 MG/DL (ref 0.6–1.3)
CRP SERPL HS-MCNC: >90 MG/L
EOSINOPHIL # BLD AUTO: 0.01 THOUSAND/ΜL (ref 0–0.61)
EOSINOPHIL NFR BLD AUTO: 0 % (ref 0–6)
ERYTHROCYTE [DISTWIDTH] IN BLOOD BY AUTOMATED COUNT: 14.1 % (ref 11.6–15.1)
GFR SERPL CREATININE-BSD FRML MDRD: 48 ML/MIN/1.73SQ M
GLUCOSE SERPL-MCNC: 125 MG/DL (ref 65–140)
HCT VFR BLD AUTO: 43.2 % (ref 36.5–49.3)
HGB BLD-MCNC: 13.9 G/DL (ref 12–17)
IMM GRANULOCYTES # BLD AUTO: 0.04 THOUSAND/UL (ref 0–0.2)
IMM GRANULOCYTES NFR BLD AUTO: 1 % (ref 0–2)
INR PPP: 1.02 (ref 0.84–1.19)
LACTATE SERPL-SCNC: 1.2 MMOL/L (ref 0.5–2)
LYMPHOCYTES # BLD AUTO: 1.4 THOUSANDS/ΜL (ref 0.6–4.47)
LYMPHOCYTES NFR BLD AUTO: 19 % (ref 14–44)
MCH RBC QN AUTO: 27.9 PG (ref 26.8–34.3)
MCHC RBC AUTO-ENTMCNC: 32.2 G/DL (ref 31.4–37.4)
MCV RBC AUTO: 87 FL (ref 82–98)
MONOCYTES # BLD AUTO: 0.43 THOUSAND/ΜL (ref 0.17–1.22)
MONOCYTES NFR BLD AUTO: 6 % (ref 4–12)
NEUTROPHILS # BLD AUTO: 5.32 THOUSANDS/ΜL (ref 1.85–7.62)
NEUTS SEG NFR BLD AUTO: 74 % (ref 43–75)
NRBC BLD AUTO-RTO: 0 /100 WBCS
NT-PROBNP SERPL-MCNC: 39 PG/ML
PLATELET # BLD AUTO: 187 THOUSANDS/UL (ref 149–390)
PMV BLD AUTO: 10.2 FL (ref 8.9–12.7)
POTASSIUM SERPL-SCNC: 3.9 MMOL/L (ref 3.5–5.3)
PROCALCITONIN SERPL-MCNC: <0.05 NG/ML
PROT SERPL-MCNC: 8.6 G/DL (ref 6.4–8.2)
PROTHROMBIN TIME: 13.6 SECONDS (ref 11.6–14.5)
RBC # BLD AUTO: 4.98 MILLION/UL (ref 3.88–5.62)
SODIUM SERPL-SCNC: 142 MMOL/L (ref 136–145)
TROPONIN I SERPL-MCNC: <0.02 NG/ML
WBC # BLD AUTO: 7.22 THOUSAND/UL (ref 4.31–10.16)

## 2021-08-22 PROCEDURE — 93005 ELECTROCARDIOGRAM TRACING: CPT

## 2021-08-22 PROCEDURE — 85610 PROTHROMBIN TIME: CPT | Performed by: EMERGENCY MEDICINE

## 2021-08-22 PROCEDURE — 83605 ASSAY OF LACTIC ACID: CPT | Performed by: EMERGENCY MEDICINE

## 2021-08-22 PROCEDURE — 86141 C-REACTIVE PROTEIN HS: CPT | Performed by: EMERGENCY MEDICINE

## 2021-08-22 PROCEDURE — 80053 COMPREHEN METABOLIC PANEL: CPT | Performed by: EMERGENCY MEDICINE

## 2021-08-22 PROCEDURE — 82553 CREATINE MB FRACTION: CPT | Performed by: EMERGENCY MEDICINE

## 2021-08-22 PROCEDURE — 96360 HYDRATION IV INFUSION INIT: CPT

## 2021-08-22 PROCEDURE — 99284 EMERGENCY DEPT VISIT MOD MDM: CPT | Performed by: EMERGENCY MEDICINE

## 2021-08-22 PROCEDURE — 36415 COLL VENOUS BLD VENIPUNCTURE: CPT

## 2021-08-22 PROCEDURE — 82550 ASSAY OF CK (CPK): CPT | Performed by: EMERGENCY MEDICINE

## 2021-08-22 PROCEDURE — 84484 ASSAY OF TROPONIN QUANT: CPT | Performed by: EMERGENCY MEDICINE

## 2021-08-22 PROCEDURE — 99285 EMERGENCY DEPT VISIT HI MDM: CPT

## 2021-08-22 PROCEDURE — 83880 ASSAY OF NATRIURETIC PEPTIDE: CPT | Performed by: EMERGENCY MEDICINE

## 2021-08-22 PROCEDURE — 85025 COMPLETE CBC W/AUTO DIFF WBC: CPT | Performed by: EMERGENCY MEDICINE

## 2021-08-22 PROCEDURE — 71045 X-RAY EXAM CHEST 1 VIEW: CPT

## 2021-08-22 PROCEDURE — 87040 BLOOD CULTURE FOR BACTERIA: CPT | Performed by: EMERGENCY MEDICINE

## 2021-08-22 PROCEDURE — 85730 THROMBOPLASTIN TIME PARTIAL: CPT | Performed by: EMERGENCY MEDICINE

## 2021-08-22 PROCEDURE — 84145 PROCALCITONIN (PCT): CPT | Performed by: EMERGENCY MEDICINE

## 2021-08-22 RX ORDER — MULTIVIT WITH MINERALS/LUTEIN
1000 TABLET ORAL 2 TIMES DAILY
Qty: 28 TABLET | Refills: 0 | Status: SHIPPED | OUTPATIENT
Start: 2021-08-22 | End: 2021-09-05

## 2021-08-22 RX ORDER — BUDESONIDE 180 UG/1
4 AEROSOL, POWDER RESPIRATORY (INHALATION) 2 TIMES DAILY
Qty: 1 EACH | Refills: 0 | Status: SHIPPED | OUTPATIENT
Start: 2021-08-22

## 2021-08-22 RX ORDER — VIT B COMP NO.3/FOLIC/C/BIOTIN 1 MG-60 MG
1 TABLET ORAL
Qty: 14 TABLET | Refills: 0 | Status: SHIPPED | OUTPATIENT
Start: 2021-08-22 | End: 2021-09-05

## 2021-08-22 RX ORDER — ALBUTEROL SULFATE 90 UG/1
1 AEROSOL, METERED RESPIRATORY (INHALATION) ONCE
Status: COMPLETED | OUTPATIENT
Start: 2021-08-22 | End: 2021-08-22

## 2021-08-22 RX ORDER — MELATONIN
2000 DAILY
Qty: 28 TABLET | Refills: 0 | Status: SHIPPED | OUTPATIENT
Start: 2021-08-22 | End: 2021-09-05

## 2021-08-22 RX ORDER — IBUPROFEN 600 MG/1
600 TABLET ORAL ONCE
Status: COMPLETED | OUTPATIENT
Start: 2021-08-22 | End: 2021-08-22

## 2021-08-22 RX ORDER — SODIUM CHLORIDE 9 MG/ML
3 INJECTION INTRAVENOUS
Status: DISCONTINUED | OUTPATIENT
Start: 2021-08-22 | End: 2021-08-23 | Stop reason: HOSPADM

## 2021-08-22 RX ADMIN — IBUPROFEN 600 MG: 600 TABLET ORAL at 21:25

## 2021-08-22 RX ADMIN — IPRATROPIUM BROMIDE 1 PUFF: 17 AEROSOL, METERED RESPIRATORY (INHALATION) at 22:06

## 2021-08-22 RX ADMIN — ALBUTEROL SULFATE 1 PUFF: 90 AEROSOL, METERED RESPIRATORY (INHALATION) at 22:06

## 2021-08-22 RX ADMIN — SODIUM CHLORIDE 1000 ML: 0.9 INJECTION, SOLUTION INTRAVENOUS at 21:32

## 2021-08-23 NOTE — ED NOTES
Contact daughter for more information and with an update, Daughter added to patient contact list in chart, Loki Winter  08/22/21 [id number]

## 2021-08-23 NOTE — ED PROVIDER NOTES
History  Chief Complaint   Patient presents with    Shortness of Breath     Pt comes to ED for increased SOB  +COVID-19 on 8/16  Hx emphysema     42-year-old gentleman comes in for evaluation of fever and shortness of breath  Patient tested COVID positive on 08/16  Patient states the 1st couple of days he did not feel too bad but over the last 24 hours he has become increasingly more short of breath and worsening fever  He also states he has had no appetite and has not been eating or drinking much  Patient also complains of multiple episodes of loose stools today  History provided by:  Patient and medical records   used: No    Shortness of Breath  Severity:  Severe  Onset quality:  Gradual  Timing:  Constant  Progression:  Worsening  Chronicity:  New  Context: URI    Ineffective treatments:  Inhaler  Associated symptoms: cough and fever    Associated symptoms: no abdominal pain, no chest pain, no headaches, no rash, no vomiting and no wheezing    Risk factors: no recent alcohol use and no obesity        Prior to Admission Medications   Prescriptions Last Dose Informant Patient Reported? Taking?    HYDROcodone-acetaminophen (NORCO) 5-325 mg per tablet   No No   Sig: Take 1-2 tablets by mouth every 6 (six) hours as needed for painMax Daily Amount: 8 tablets   albuterol (Ventolin HFA) 90 mcg/act inhaler   No No   Sig: Inhale 2 puffs every 6 (six) hours as needed for wheezing   aspirin 81 MG tablet   Yes No   Sig: Take 81 mg by mouth   atorvastatin (LIPITOR) 20 mg tablet   No No   Sig: Take 1 tablet (20 mg total) by mouth daily   colchicine (COLCRYS) 0 6 mg tablet   No No   Sig: Take 1 tablet (0 6 mg total) by mouth daily   Patient not taking: Reported on 2/19/2019   diclofenac sodium (VOLTAREN) 50 mg EC tablet   Yes No   Sig: TAKE ONE TAB BY MOUTH TWICE A DAY AS NEEDED FOR PAIN   esomeprazole (NexIUM) 40 MG capsule   Yes No   Sig: esomeprazole magnesium 40 mg capsule,delayed release febuxostat (Uloric) 40 mg tablet   No No   Sig: Take 1 tablet (40 mg total) by mouth daily   fluocinolone acetonide (DERMOTIC) 0 01 % otic oil   Yes No   Sig: fluocinolone acetonide oil 0 01 % ear drops   INSTILL 1-2 DROPS INTO AFFECTED EAR(S) BY OTIC ROUTE 2 TIMES PER DAY AS NEEDED   fluticasone (FLONASE) 50 mcg/act nasal spray   No No   Si sprays into each nostril daily      Facility-Administered Medications: None       Past Medical History:   Diagnosis Date    Cancer [facility])     last assessed [id number] Kidney area cancer    High cholesterol     Lung disease     last assessed [id number]       Past Surgical History:   Procedure Laterality Date    COLONOSCOPY      KIDNEY SURGERY      last assessed [id number]    LIPOMA RESECTION         Family History   Problem Relation Age of Onset    No Known Problems Mother     No Known Problems Father      I have reviewed and agree with the history as documented  E-Cigarette/Vaping    E-Cigarette Use Never User      E-Cigarette/Vaping Substances     Social History     Tobacco Use    Smoking status: Former Smoker    Smokeless tobacco: Never Used    Tobacco comment: h/o only brief smoking many years ago   Vaping Use    Vaping Use: Never used   Substance Use Topics    Alcohol use: Yes     Comment: occasional    Drug use: Never       Review of Systems   Constitutional: Positive for fever  Negative for activity change and appetite change  HENT: Negative for congestion and facial swelling  Eyes: Negative for discharge and redness  Respiratory: Positive for cough and shortness of breath  Negative for wheezing  Cardiovascular: Negative for chest pain and leg swelling  Gastrointestinal: Negative for abdominal distention, abdominal pain, blood in stool and vomiting  Endocrine: Negative for cold intolerance and polydipsia  Genitourinary: Negative for difficulty urinating and hematuria  Musculoskeletal: Negative for arthralgias and gait problem     Skin: Negative for color change and rash  Allergic/Immunologic: Negative for food allergies and immunocompromised state  Neurological: Negative for dizziness, seizures and headaches  Hematological: Negative for adenopathy  Does not bruise/bleed easily  Psychiatric/Behavioral: Negative for agitation, confusion and decreased concentration  All other systems reviewed and are negative  Physical Exam  Physical Exam  Vitals and nursing note reviewed  Constitutional:       Appearance: He is well-developed  He is not toxic-appearing  HENT:      Head: Normocephalic and atraumatic  Right Ear: Tympanic membrane normal       Left Ear: Tympanic membrane normal       Nose: Nose normal    Eyes:      General: Lids are normal       Conjunctiva/sclera: Conjunctivae normal       Pupils: Pupils are equal, round, and reactive to light  Neck:      Vascular: No carotid bruit or JVD  Trachea: Trachea normal    Cardiovascular:      Rate and Rhythm: Normal rate and regular rhythm  No extrasystoles are present  Heart sounds: Normal heart sounds  Pulmonary:      Effort: Pulmonary effort is normal  Tachypnea present  Breath sounds: Decreased breath sounds present  No wheezing, rhonchi or rales  Chest:      Chest wall: No deformity or tenderness  Abdominal:      General: Bowel sounds are normal       Palpations: Abdomen is soft  Tenderness: There is no abdominal tenderness  There is no guarding or rebound  Musculoskeletal:      Right shoulder: No swelling, deformity or tenderness  Normal range of motion  Cervical back: Normal range of motion and neck supple  No deformity, tenderness or bony tenderness  Lymphadenopathy:      Cervical: No cervical adenopathy  Skin:     General: Skin is warm and dry  Neurological:      Mental Status: He is alert and oriented to person, place, and time  Cranial Nerves: No cranial nerve deficit  Sensory: No sensory deficit        Deep Tendon Reflexes: Reflexes are normal and symmetric  Psychiatric:         Speech: Speech normal          Behavior: Behavior normal          Thought Content: Thought content normal          Judgment: Judgment normal          Vital Signs  ED Triage Vitals [08/22/21 2041]   Temperature Pulse Respirations Blood Pressure SpO2   (!) 102 1 °F (38 9 °C) 94 22 138/71 96 %      Temp Source Heart Rate Source Patient Position - Orthostatic VS BP Location FiO2 (%)   Oral Monitor Sitting Left arm --      Pain Score       --           Vitals:    08/22/21 2041 08/22/21 2141   BP: 138/71 136/81   Pulse: 94 88   Patient Position - Orthostatic VS: Sitting          Visual Acuity      ED Medications  Medications   sodium chloride (PF) 0 9 % injection 3 mL (has no administration in time range)   sodium chloride 0 9 % bolus 1,000 mL (1,000 mL Intravenous New Bag 8/22/21 2132)   ibuprofen (MOTRIN) tablet 600 mg (600 mg Oral Given 8/22/21 2125)   albuterol (PROVENTIL HFA,VENTOLIN HFA) inhaler 1 puff (1 puff Inhalation Given 8/22/21 2206)   ipratropium (ATROVENT HFA) inhaler 1 puff (1 puff Inhalation Given 8/22/21 2206)       Diagnostic Studies  Results Reviewed     Procedure Component Value Units Date/Time    High sensitivity CRP [[id number]] Collected: 08/22/21 2049    Lab Status: In process Specimen: Blood from Arm, Left Updated: 08/22/21 2141    NT-BNP PRO [[id number]] Collected: 08/22/21 2049    Lab Status: In process Specimen: Blood from Arm, Left Updated: 08/22/21 2141    CK Total with Reflex CKMB [[id number]] Collected: 08/22/21 2049    Lab Status: In process Specimen: Blood from Arm, Left Updated: 08/22/21 2141    Lactic acid [[id number]]  (Normal) Collected: 08/22/21 2049    Lab Status: Final result Specimen: Blood from Arm, Left Updated: 08/22/21 2134     LACTIC ACID 1 2 mmol/L     Narrative:      Result may be elevated if tourniquet was used during collection      Comprehensive metabolic panel [[id number]]  (Abnormal) Collected: 08/22/21 2049    Lab Status: Final result Specimen: Blood from Arm, Left Updated: 08/22/21 2131     Sodium 142 mmol/L      Potassium 3 9 mmol/L      Chloride 105 mmol/L      CO2 26 mmol/L      ANION GAP 11 mmol/L      BUN 13 mg/dL      Creatinine 1 50 mg/dL      Glucose 125 mg/dL      Calcium 8 6 mg/dL      AST 50 U/L       U/L      Alkaline Phosphatase 83 U/L      Total Protein 8 6 g/dL      Albumin 3 6 g/dL      Total Bilirubin 0 98 mg/dL      eGFR 48 ml/min/1 73sq m     Narrative:      [place] guidelines for Chronic Kidney Disease (CKD):     Stage 1 with normal or high GFR (GFR > 90 mL/min/1 73 square meters)    Stage 2 Mild CKD (GFR = 60-89 mL/min/1 73 square meters)    Stage 3A Moderate CKD (GFR = 45-59 mL/min/1 73 square meters)    Stage 3B Moderate CKD (GFR = 30-44 mL/min/1 73 square meters)    Stage 4 Severe CKD (GFR = 15-29 mL/min/1 73 square meters)    Stage 5 End Stage CKD (GFR <15 mL/min/1 73 square meters)  Note: GFR calculation is accurate only with a steady state creatinine    Troponin I [[id number]]  (Normal) Collected: 08/22/21 2049    Lab Status: Final result Specimen: Blood from Arm, Left Updated: 08/22/21 2129     Troponin I <0 02 ng/mL     APTT [[id number]]  (Normal) Collected: 08/22/21 2049    Lab Status: Final result Specimen: Blood from Arm, Left Updated: 08/22/21 2119     PTT 29 seconds     Protime-INR [[id number]]  (Normal) Collected: 08/22/21 2049    Lab Status: Final result Specimen: Blood from Arm, Left Updated: 08/22/21 2119     Protime 13 6 seconds      INR 1 02    CBC and differential [[id number]] Collected: 08/22/21 2049    Lab Status: Final result Specimen: Blood from Arm, Left Updated: 08/22/21 2115     WBC 7 22 Thousand/uL      RBC 4 98 Million/uL      Hemoglobin 13 9 g/dL      Hematocrit 43 2 %      MCV 87 fL      MCH 27 9 pg      MCHC 32 2 g/dL      RDW 14 1 %      MPV 10 2 fL      Platelets 804 Thousands/uL      nRBC 0 /100 WBCs      Neutrophils Relative 74 %      Immat GRANS % 1 %      Lymphocytes Relative 19 %      Monocytes Relative 6 %      Eosinophils Relative 0 %      Basophils Relative 0 %      Neutrophils Absolute 5 32 Thousands/µL      Immature Grans Absolute 0 04 Thousand/uL      Lymphocytes Absolute 1 40 Thousands/µL      Monocytes Absolute 0 43 Thousand/µL      Eosinophils Absolute 0 01 Thousand/µL      Basophils Absolute 0 02 Thousands/µL     Procalcitonin with AM Reflex [[id number]] Collected: 08/22/21 2049    Lab Status: In process Specimen: Blood from Arm, Left Updated: 08/22/21 2104    Blood culture #1 [[id number]] Collected: 08/22/21 2049    Lab Status: In process Specimen: Blood from Arm, Left Updated: 08/22/21 2104    Blood culture #2 [[id number]] Collected: 08/22/21 2052    Lab Status: In process Specimen: Blood from Arm, Right Updated: 08/22/21 2104                 XR chest 1 view portable    (Results Pending)              Procedures  Procedures         ED Course                                           MDM  Number of Diagnoses or Management Options  COVID-19: new and requires workup     Amount and/or Complexity of Data Reviewed  Clinical lab tests: ordered and reviewed  Tests in the radiology section of CPT®: ordered and reviewed  Tests in the medicine section of CPT®: ordered and reviewed  Independent visualization of images, tracings, or specimens: yes    Risk of Complications, Morbidity, and/or Mortality  General comments: Patient's O2 sat was between 96 and 97% at rest and never went below 94% with ambulation  I will give patient medications for home      Patient Progress  Patient progress: stable      Disposition  Final diagnoses:   [id number]     Time reflects when diagnosis was documented in both MDM as applicable and the Disposition within this note     Time User Action Codes Description Comment    8/22/2021 10:07 PM Bianca Castro Add [U07 1] COVID-19       ED Disposition     ED Disposition Condition Date/Time Comment Discharge Stable Sun Aug 22, 2021 10:07 PM [facility] discharge to home/self care  Follow-up Information     Follow up With Specialties Details Why [address] [facility] [address], [place] [facility] Medicine Schedule an appointment as soon as possible for a visit   One [address]  [phone]            Patient's Medications   Discharge Prescriptions    ASCORBIC ACID (VITAMIN C) 1000 MG TABLET    Take 1 tablet (1,000 mg total) by mouth 2 (two) times a day for 14 days       Start Date: 8/22/2021 End Date: 9/5/2021       Order Dose: 1,000 mg       Quantity: 28 tablet    Refills: 0    B COMPLEX-C-FOLIC ACID (B COMPLEX-VITAMIN C-FOLIC ACID) 1 MG TABLET    Take 1 tablet by mouth daily with breakfast for 14 days       Start Date: 8/22/2021 End Date: 9/5/2021       Order Dose: 1 tablet       Quantity: 14 tablet    Refills: 0    BUDESONIDE (PULMICORT FLEXHALER) 180 MCG/ACT INHALER    Inhale 4 puffs 2 (two) times a day Rinse mouth after use  Start Date: 8/22/2021 End Date: --       Order Dose: 4 puffs       Quantity: 1 each    Refills: 0    CHOLECALCIFEROL (VITAMIN D3) 1,000 UNITS TABLET    Take 2 tablets (2,000 Units total) by mouth daily for 14 days       Start Date: 8/22/2021 End Date: 9/5/2021       Order Dose: 2,000 Units       Quantity: 28 tablet    Refills: 0     No discharge procedures on file      PDMP Review     None          ED Provider  Electronically Signed by           Mandy Dumont DO  08/22/21 2418

## 2021-08-25 LAB
ATRIAL RATE: 94 BPM
P AXIS: 98 DEGREES
PR INTERVAL: 158 MS
QRS AXIS: -22 DEGREES
QRSD INTERVAL: 80 MS
QT INTERVAL: 336 MS
QTC INTERVAL: 412 MS
T WAVE AXIS: 69 DEGREES
VENTRICULAR RATE: 90 BPM

## 2021-08-25 PROCEDURE — 93010 ELECTROCARDIOGRAM REPORT: CPT | Performed by: INTERNAL MEDICINE

## 2021-08-28 LAB
BACTERIA BLD CULT: NORMAL
BACTERIA BLD CULT: NORMAL

## 2022-03-16 ENCOUNTER — TELEPHONE (OUTPATIENT)
Dept: FAMILY MEDICINE CLINIC | Facility: CLINIC | Age: 67
End: 2022-03-16

## 2022-08-16 ENCOUNTER — TELEPHONE (OUTPATIENT)
Dept: FAMILY MEDICINE CLINIC | Facility: CLINIC | Age: 67
End: 2022-08-16

## 2023-01-12 NOTE — TELEPHONE ENCOUNTER
Gout flared up again and has work tomorrow, would like refill today please Medical Necessity Statement: Based on my medical judgement, Mohs surgery is medically necessary as it is the most appropriate treatment for this cancer compared to other treatments.

## 2024-02-21 PROBLEM — R05.9 COUGH: Status: RESOLVED | Noted: 2018-02-13 | Resolved: 2024-02-21

## 2024-02-21 PROBLEM — Z12.11 SCREENING FOR COLON CANCER: Status: RESOLVED | Noted: 2018-07-31 | Resolved: 2024-02-21

## 2025-04-18 ENCOUNTER — APPOINTMENT (EMERGENCY)
Dept: RADIOLOGY | Facility: HOSPITAL | Age: 70
End: 2025-04-18
Payer: MEDICARE

## 2025-04-18 ENCOUNTER — HOSPITAL ENCOUNTER (EMERGENCY)
Facility: HOSPITAL | Age: 70
Discharge: HOME | End: 2025-04-18
Attending: EMERGENCY MEDICINE
Payer: MEDICARE

## 2025-04-18 VITALS
OXYGEN SATURATION: 98 % | RESPIRATION RATE: 20 BRPM | SYSTOLIC BLOOD PRESSURE: 125 MMHG | HEART RATE: 76 BPM | DIASTOLIC BLOOD PRESSURE: 79 MMHG | TEMPERATURE: 98.5 F | BODY MASS INDEX: 40.39 KG/M2 | WEIGHT: 265.65 LBS
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
## 2025-04-18 DIAGNOSIS — S83.92XA SPRAIN OF LEFT KNEE, UNSPECIFIED LIGAMENT, INITIAL ENCOUNTER: ICD-10-CM

## 2025-04-18 DIAGNOSIS — S40.022A CONTUSION OF LEFT UPPER ARM, INITIAL ENCOUNTER: Primary | ICD-10-CM

## 2025-04-18 DIAGNOSIS — M10.9 ACUTE GOUT OF RIGHT ANKLE, UNSPECIFIED CAUSE: ICD-10-CM

## 2025-04-18 PROCEDURE — 73564 X-RAY EXAM KNEE 4 OR MORE: CPT

## 2025-04-18 PROCEDURE — 99284 EMERGENCY DEPT VISIT MOD MDM: CPT | Performed by: EMERGENCY MEDICINE

## 2025-04-18 PROCEDURE — 99283 EMERGENCY DEPT VISIT LOW MDM: CPT

## 2025-04-18 RX ORDER — OXYCODONE AND ACETAMINOPHEN 5; 325 MG/1; MG/1
1 TABLET ORAL ONCE
Refills: 0 | Status: COMPLETED | OUTPATIENT
Start: 2025-04-18 | End: 2025-04-18

## 2025-04-18 RX ORDER — OXYCODONE HYDROCHLORIDE 5 MG/1
5 TABLET ORAL EVERY 4 HOURS PRN
Qty: 5 TABLET | Refills: 0 | Status: SHIPPED | OUTPATIENT
Start: 2025-04-18 | End: 2025-04-28

## 2025-04-18 RX ADMIN — OXYCODONE HYDROCHLORIDE AND ACETAMINOPHEN 1 TABLET: 5; 325 TABLET ORAL at 15:45

## 2025-04-18 NOTE — ED PROVIDER NOTES
Time reflects when diagnosis was documented in both MDM as applicable and the Disposition within this note       Time User Action Codes Description Comment    4/18/2025  2:26 PM Kunal Eden Add [S40.022A] Contusion of left upper arm, initial encounter     4/18/2025  2:26 PM Kunal Eden Add [S83.92XA] Sprain of left knee, unspecified ligament, initial encounter     4/18/2025  2:26 PM Kunal Eden Add [M10.9] Acute gout of right ankle, unspecified cause     4/18/2025  2:26 PM Kunal Eden Add [W19.XXXA] Fall, initial encounter           ED Disposition       ED Disposition   Discharge    Condition   Stable    Date/Time   Fri Apr 18, 2025  2:25 PM    Comment   Pasha Porras discharge to home/self care.                   Assessment & Plan       Medical Decision Making  This is a 69 years old with active flare of gout presents downstairs while on crutches.  X-ray of the left knee was ordered looking for hairline fractures in the left knee.    X-ray was reviewed, no fracture was noted.  Pain medication was ordered, and patient was instructed to follow-up with primary care.    Amount and/or Complexity of Data Reviewed  Radiology: ordered and independent interpretation performed.    Risk  Prescription drug management.             Medications - No data to display    ED Risk Strat Scores                    No data recorded                            History of Present Illness       Chief Complaint   Patient presents with    Fall     Pt arrives via EMS from home for a fall. Patient was ambulating up steps with crutches and tripped and fell down about 10 steps. States he has gout in R ft and that's why he uses crutches. Denies hitting head.       Past Medical History:   Diagnosis Date    Cancer (HCC)     last assessed [id number] Kidney area cancer    High cholesterol     Lung disease     last assessed [id number]      Past Surgical History:   Procedure Laterality Date    COLONOSCOPY      KIDNEY SURGERY      last  assessed [id number]    LIPOMA RESECTION        Family History   Problem Relation Age of Onset    No Known Problems Mother     No Known Problems Father       Social History     Tobacco Use    Smoking status: Former    Smokeless tobacco: Never    Tobacco comments:     h/o only brief smoking many years ago   Vaping Use    Vaping status: Never Used   Substance Use Topics    Alcohol use: Yes     Comment: occasional    Drug use: Never      E-Cigarette/Vaping    E-Cigarette Use Never User       E-Cigarette/Vaping Substances      I have reviewed and agree with the history as documented.     This is a 69 years old with currently on active gout flare on crutches presented with fall down the stairs.    Patient was walking downstairs while on crutches, when off balance, fell down 12 stairs.  Patient denies any head strike, nausea, vomiting, double vision.  Patient endorses mechanical strike to the left bicep, additionally left lateral distal knee, and right distal anterior leg.     However patient denies any changes in his strength, length, sensation.      Fall      Review of Systems        Objective       ED Triage Vitals [04/18/25 1322]   Temperature Pulse Blood Pressure Respirations SpO2 Patient Position - Orthostatic VS   98.5 °F (36.9 °C) 82 156/67 20 97 % --      Temp Source Heart Rate Source BP Location FiO2 (%) Pain Score    Oral Monitor -- -- 6      Vitals      Date and Time Temp Pulse SpO2 Resp BP Pain Score FACES Pain Rating User   04/18/25 1322 98.5 °F (36.9 °C) 82 97 % 20 156/67 6 -- CH            Physical Exam  Vitals and nursing note reviewed.   Constitutional:       General: He is not in acute distress.     Appearance: He is well-developed.   HENT:      Head: Normocephalic and atraumatic.   Eyes:      Conjunctiva/sclera: Conjunctivae normal.   Cardiovascular:      Rate and Rhythm: Normal rate and regular rhythm.      Heart sounds: No murmur heard.  Pulmonary:      Effort: Pulmonary effort is normal. No  respiratory distress.      Breath sounds: Normal breath sounds.   Abdominal:      Palpations: Abdomen is soft.      Tenderness: There is no abdominal tenderness.   Musculoskeletal:         General: No swelling.      Cervical back: Neck supple.      Comments: Normal range of motion of bilateral shoulders, bilateral hip, bilateral knee  Tenderness to palpation to the left fibular head, however length of the legs are symmetric.  There is tenderness in the right medial malleolus and swelling from presumed gout flare that presented couple days ago.  There is mild tenderness in the thoracic spine, however there is no step-off.       Skin:     General: Skin is warm and dry.      Capillary Refill: Capillary refill takes less than 2 seconds.   Neurological:      Mental Status: He is alert.   Psychiatric:         Mood and Affect: Mood normal.         Results Reviewed       None            XR knee 4+ views left injury   ED Interpretation by Kunal Eden DO (04/18 8850)   No acute fracture, degenerative changes          Procedures    ED Medication and Procedure Management   Prior to Admission Medications   Prescriptions Last Dose Informant Patient Reported? Taking?   Ascorbic Acid (vitamin C) 1000 MG tablet   No No   Sig: Take 1 tablet (1,000 mg total) by mouth 2 (two) times a day for 14 days   B Complex-C-Folic Acid (B complex-vitamin C-folic acid) 1 MG tablet   No No   Sig: Take 1 tablet by mouth daily with breakfast for 14 days   HYDROcodone-acetaminophen (NORCO) 5-325 mg per tablet   No No   Sig: Take 1-2 tablets by mouth every 6 (six) hours as needed for painMax Daily Amount: 8 tablets   albuterol (Ventolin HFA) 90 mcg/act inhaler   No No   Sig: Inhale 2 puffs every 6 (six) hours as needed for wheezing   aspirin 81 MG tablet   Yes No   Sig: Take 81 mg by mouth   atorvastatin (LIPITOR) 20 mg tablet   No No   Sig: Take 1 tablet (20 mg total) by mouth daily   budesonide (Pulmicort Flexhaler) 180 MCG/ACT inhaler   No No    Sig: Inhale 4 puffs 2 (two) times a day Rinse mouth after use.   cholecalciferol (VITAMIN D3) 1,000 units tablet   No No   Sig: Take 2 tablets (2,000 Units total) by mouth daily for 14 days   colchicine (COLCRYS) 0.6 mg tablet   No No   Sig: Take 1 tablet (0.6 mg total) by mouth daily   Patient not taking: Reported on 2019   diclofenac sodium (VOLTAREN) 50 mg EC tablet   Yes No   Sig: TAKE ONE TAB BY MOUTH TWICE A DAY AS NEEDED FOR PAIN   esomeprazole (NexIUM) 40 MG capsule   Yes No   Sig: esomeprazole magnesium 40 mg capsule,delayed release   febuxostat (Uloric) 40 mg tablet   No No   Sig: Take 1 tablet (40 mg total) by mouth daily   fluocinolone acetonide (DERMOTIC) 0.01 % otic oil   Yes No   Sig: fluocinolone acetonide oil 0.01 % ear drops   INSTILL 1-2 DROPS INTO AFFECTED EAR(S) BY OTIC ROUTE 2 TIMES PER DAY AS NEEDED   fluticasone (FLONASE) 50 mcg/act nasal spray   No No   Si sprays into each nostril daily      Facility-Administered Medications: None     Patient's Medications   Discharge Prescriptions    OXYCODONE (ROXICODONE) 5 IMMEDIATE RELEASE TABLET    Take 1 tablet (5 mg total) by mouth every 4 (four) hours as needed for moderate pain for up to 10 days Max Daily Amount: 30 mg       Start Date: 2025 End Date: 2025       Order Dose: 5 mg       Quantity: 5 tablet    Refills: 0     No discharge procedures on file.  ED SEPSIS DOCUMENTATION   Time reflects when diagnosis was documented in both MDM as applicable and the Disposition within this note       Time User Action Codes Description Comment    2025  2:26 PM Kunal Eden Add [S40.022A] Contusion of left upper arm, initial encounter     2025  2:26 PM Kunal Eden Add [S83.92XA] Sprain of left knee, unspecified ligament, initial encounter     2025  2:26 PM Kunal Eden Add [M10.9] Acute gout of right ankle, unspecified cause     2025  2:26 PM Kunal Eden Add [W19.XXXA] Fall, initial encounter                   Carlton Hernandez MD  04/18/25 7333       Carlton Hernandez MD  04/18/25 7653

## 2025-04-18 NOTE — ED ATTENDING ATTESTATION
4/18/2025  IKunal DO, saw and evaluated the patient. I have discussed the patient with the resident/non-physician practitioner and agree with the resident's/non-physician practitioner's findings, Plan of Care, and MDM as documented in the resident's/non-physician practitioner's note, except where noted. All available labs and Radiology studies were reviewed.  I was present for key portions of any procedure(s) performed by the resident/non-physician practitioner and I was immediately available to provide assistance.       At this point I agree with the current assessment done in the Emergency Department.  I have conducted an independent evaluation of this patient a history and physical is as follows:          1. Contusion of left upper arm, initial encounter    2. Sprain of left knee, unspecified ligament, initial encounter    3. Acute gout of right ankle, unspecified cause    4. Fall, initial encounter            MDM  Number of Diagnoses or Management Options  Acute gout of right ankle, unspecified cause  Contusion of left upper arm, initial encounter  Fall, initial encounter  Sprain of left knee, unspecified ligament, initial encounter  Diagnosis management comments:       Initial ED assessment:    69-year-old male, mechanical fall, had ankle pain due to gout, tripped, slid down the stairs, contusion to left upper arm contusion left knee complaining mainly of gout pain in his right ankle, identical to previous episodes of gout    Pathology at risk for includes but is not limited to:    proximal fibular fracture, knee sprain, knee contusion, upper arm contusion no evidence of bony tenderness that would be suggestive of fracture.  No head injury no neck injury no intrathoracic or intra-abdominal injury.  Gout of the ankle    Initial ED plan:   Medication/pain control for gout, x-ray knee        Final ED summary/disposition:   After evaluation and workup in the emergency department, patient discharged  with oxycodone for gout pain.  He has colchicine at home, no evidence of fracture.                 Time reflects when diagnosis was documented in both MDM as applicable and the Disposition within this note       Time User Action Codes Description Comment    4/18/2025  2:26 PM Kunal Eden [S40.022A] Contusion of left upper arm, initial encounter     4/18/2025  2:26 PM Kunal Eden [S83.92XA] Sprain of left knee, unspecified ligament, initial encounter     4/18/2025  2:26 PM Kunal Eden Add [M10.9] Acute gout of right ankle, unspecified cause     4/18/2025  2:26 PM Kunal Eden [W19.XXXA] Fall, initial encounter           ED Disposition       ED Disposition   Discharge    Condition   Stable    Date/Time   Fri Apr 18, 2025  2:25 PM    Comment   Pasha Porras discharge to home/self care.                   Follow-up Information    None                       Chief Complaint   Patient presents with    Fall     Pt arrives via EMS from home for a fall. Patient was ambulating up steps with crutches and tripped and fell down about 10 steps. States he has gout in R ft and that's why he uses crutches. Denies hitting head.                 69-year-old male, history of gout presents with right ankle pain consistent with previous episodes of gout, but mainly presents due to a fall.  Patient says because of his right ankle pain he had a hard time walking, tripped as he was going down the stairs, landed on his left side and then slid down the stairs.,  Sustained a contusion to his left upper arm and to the side of his left knee.  Was able to stand up and ambulate after.  No head injury.  No neck pain no back pain no chest pain no abdominal pain.      Fall                Visit Vitals  /79 (BP Location: Right arm)   Pulse 76   Temp 98.5 °F (36.9 °C) (Oral)   Resp 20   Wt 121 kg (265 lb 10.5 oz)   SpO2 98%   BMI 40.39 kg/m²   Smoking Status Former   BSA 2.31 m²        Physical Exam  Vitals reviewed.    Constitutional:       General: He is not in acute distress.     Appearance: He is well-developed. He is not diaphoretic.   HENT:      Head: Normocephalic and atraumatic.      Right Ear: External ear normal.      Left Ear: External ear normal.      Nose: Nose normal.   Eyes:      General:         Right eye: No discharge.         Left eye: No discharge.      Pupils: Pupils are equal, round, and reactive to light.   Neck:      Trachea: No tracheal deviation.   Cardiovascular:      Rate and Rhythm: Normal rate and regular rhythm.      Heart sounds: Normal heart sounds. No murmur heard.  Pulmonary:      Effort: Pulmonary effort is normal. No respiratory distress.      Breath sounds: Normal breath sounds. No stridor.   Abdominal:      General: There is no distension.      Palpations: Abdomen is soft.      Tenderness: There is no abdominal tenderness. There is no guarding or rebound.   Musculoskeletal:         General: Normal range of motion.      Cervical back: Normal range of motion and neck supple.      Comments: No spinous process tenderness to cervical thoracic or lumbar spines.    Tender to palpation over left proximal fibular head.      Tender over right medial malleolus where he has gout.      Moderate size contusion over left upper arm over bicep area.  No focal bony tenderness.  No hematomas.   Skin:     General: Skin is warm and dry.      Coloration: Skin is not pale.      Findings: No erythema.   Neurological:      General: No focal deficit present.      Mental Status: He is alert and oriented to person, place, and time.                       Medications   oxyCODONE-acetaminophen (PERCOCET) 5-325 mg per tablet 1 tablet (has no administration in time range)             Labs Reviewed - No data to display      XR knee 4+ views left injury   ED Interpretation   No acute fracture, degenerative changes                     Procedures